# Patient Record
Sex: FEMALE | Race: OTHER | HISPANIC OR LATINO | Employment: UNEMPLOYED | URBAN - METROPOLITAN AREA
[De-identification: names, ages, dates, MRNs, and addresses within clinical notes are randomized per-mention and may not be internally consistent; named-entity substitution may affect disease eponyms.]

---

## 2017-12-04 ENCOUNTER — OFFICE VISIT (OUTPATIENT)
Dept: NEUROLOGY | Facility: CLINIC | Age: 52
End: 2017-12-04
Payer: MEDICAID

## 2017-12-04 VITALS
HEIGHT: 60 IN | DIASTOLIC BLOOD PRESSURE: 91 MMHG | HEART RATE: 71 BPM | BODY MASS INDEX: 35.23 KG/M2 | SYSTOLIC BLOOD PRESSURE: 150 MMHG | WEIGHT: 179.44 LBS

## 2017-12-04 DIAGNOSIS — G44.209 TENSION HEADACHE: ICD-10-CM

## 2017-12-04 DIAGNOSIS — G43.009 MIGRAINE WITHOUT AURA AND WITHOUT STATUS MIGRAINOSUS, NOT INTRACTABLE: Primary | ICD-10-CM

## 2017-12-04 PROCEDURE — 99204 OFFICE O/P NEW MOD 45 MIN: CPT | Mod: S$PBB,,, | Performed by: NEUROLOGICAL SURGERY

## 2017-12-04 PROCEDURE — 99999 PR PBB SHADOW E&M-NEW PATIENT-LVL III: CPT | Mod: PBBFAC,,, | Performed by: NEUROLOGICAL SURGERY

## 2017-12-04 PROCEDURE — 99203 OFFICE O/P NEW LOW 30 MIN: CPT | Mod: PBBFAC,PO | Performed by: NEUROLOGICAL SURGERY

## 2017-12-04 RX ORDER — MECLIZINE HYDROCHLORIDE 25 MG/1
25 TABLET ORAL 3 TIMES DAILY PRN
COMMUNITY

## 2017-12-04 RX ORDER — HYDROCHLOROTHIAZIDE 12.5 MG/1
12.5 TABLET ORAL DAILY
COMMUNITY

## 2017-12-04 RX ORDER — BUTALBITAL, ACETAMINOPHEN AND CAFFEINE 50; 325; 40 MG/1; MG/1; MG/1
1 TABLET ORAL EVERY 8 HOURS PRN
Qty: 60 TABLET | Refills: 2 | Status: SHIPPED | OUTPATIENT
Start: 2017-12-04 | End: 2018-01-03

## 2017-12-04 RX ORDER — CYCLOBENZAPRINE HCL 10 MG
10 TABLET ORAL NIGHTLY
Qty: 60 TABLET | Refills: 3 | Status: SHIPPED | OUTPATIENT
Start: 2017-12-04 | End: 2017-12-14

## 2017-12-04 RX ORDER — LORATADINE 10 MG/1
10 TABLET ORAL DAILY
COMMUNITY

## 2017-12-04 NOTE — PROGRESS NOTES
"Chief Complaint   Patient presents with    Neurologic Problem     Loss of balance         Lita Culver is a 51 y.o. female with a history of multiple medical diagnoses as listed below that presents for evaluation of headaches and imbalance. She was interviewed and examined with her son present. The patient chose to have her son interpret portions of the interview, which was allowed per the patient's preference. She has been having several months of pain in the center of the head that has been radiating on occasion to the left side of her head and also into the upper portion of her neck and shoulders as well. She describes the pain as a constant pressure in the center of the head that radiates to the left side. She has not felt that the pain has travelled to the right side of her head. During the time of the headache she feels that her gait is unstable and she is not able to maintain her balance. There have also been times when she is riding in a car and attempts to look over her shoulder and will suddenly feel "dizzy" for a few moments. during that time she feels that the car is still accelerating though she is braking and she feels that the cars on either side of her seem as if they are converging upon her. She has not had any sensations of the room spinning.  She says when she denied any headache she does not have any abnormal sensations.  She has not had any falls due to the unsteadiness in her legs.  The last few weeks she has had fewer symptoms and feels better overall compared to when she began to have symptoms several months ago.  She has a history of migraines but has not had any years.  She says her migraines that she had before.    PAST MEDICAL HISTORY:  History reviewed. No pertinent past medical history.    PAST SURGICAL HISTORY:  History reviewed. No pertinent surgical history.    SOCIAL HISTORY:  Social History     Social History    Marital status: Single     Spouse name: N/A    Number of " children: N/A    Years of education: N/A     Occupational History    Not on file.     Social History Main Topics    Smoking status: Not on file    Smokeless tobacco: Not on file    Alcohol use Not on file    Drug use: Unknown    Sexual activity: Not on file     Other Topics Concern    Not on file     Social History Narrative    No narrative on file       FAMILY HISTORY:  History reviewed. No pertinent family history.    ALLERGIES AND MEDICATIONS: updated and reviewed.  Review of patient's allergies indicates:  No Known Allergies  Current Outpatient Prescriptions   Medication Sig Dispense Refill    hydroCHLOROthiazide (HYDRODIURIL) 12.5 MG Tab Take 12.5 mg by mouth once daily.      loratadine (CLARITIN) 10 mg tablet Take 10 mg by mouth once daily.      meclizine (ANTIVERT) 25 mg tablet Take 25 mg by mouth 3 (three) times daily as needed.      butalbital-acetaminophen-caffeine -40 mg (FIORICET, ESGIC) -40 mg per tablet Take 1 tablet by mouth every 8 (eight) hours as needed for Pain or Headaches. 60 tablet 2    cyclobenzaprine (FLEXERIL) 10 MG tablet Take 1 tablet (10 mg total) by mouth nightly. 60 tablet 3     No current facility-administered medications for this visit.        Review of Systems   Constitutional: Negative for activity change, appetite change, fever and unexpected weight change.   HENT: Negative for hearing loss, tinnitus, trouble swallowing and voice change.    Eyes: Positive for photophobia. Negative for visual disturbance.   Respiratory: Negative for apnea and shortness of breath.    Cardiovascular: Negative for chest pain and leg swelling.   Gastrointestinal: Negative for constipation and nausea.   Genitourinary: Negative for difficulty urinating.   Musculoskeletal: Positive for gait problem, myalgias and neck pain. Negative for back pain.   Skin: Negative for color change and pallor.   Neurological: Positive for headaches. Negative for dizziness, seizures, syncope,  weakness and numbness.   Hematological: Negative for adenopathy.   Psychiatric/Behavioral: Negative for agitation, confusion and decreased concentration.       Neurologic Exam     Mental Status   Oriented to person, place, and time.   Registration: recalls 3 of 3 objects.   Attention: normal. Concentration: normal.   Speech: speech is normal   Level of consciousness: alert  Knowledge: good.     Cranial Nerves     CN II   Visual fields full to confrontation.   Right visual field deficit: none  Left visual field deficit: none     CN III, IV, VI   Pupils are equal, round, and reactive to light.  Extraocular motions are normal.   Right pupil: Size: 3 mm. Shape: regular. Accommodation: intact.   Left pupil: Size: 3 mm. Shape: regular. Accommodation: intact.   CN III: no CN III palsy  CN VI: no CN VI palsy  Nystagmus: none   Diplopia: none  Ophthalmoparesis: none  Upgaze: normal  Downgaze: normal  Conjugate gaze: present    CN V   Facial sensation intact.   Right facial sensation deficit: none  Left facial sensation deficit: none    CN VII   Facial expression full, symmetric.   Right facial weakness: none  Left facial weakness: none    CN VIII   CN VIII normal.     CN IX, X   CN IX normal.   CN X normal.   Palate: symmetric    CN XI   CN XI normal.   Right sternocleidomastoid strength: normal  Left sternocleidomastoid strength: normal  Right trapezius strength: normal  Left trapezius strength: normal    CN XII   CN XII normal.   Tongue deviation: none    Motor Exam   Muscle bulk: normal  Overall muscle tone: normal  Right arm tone: normal  Left arm tone: normal  Right leg tone: normal  Left leg tone: normal    Strength   Strength 5/5 throughout.     Sensory Exam   Right arm light touch: normal  Left arm light touch: normal  Right leg light touch: normal  Left leg light touch: normal  Right arm vibration: normal  Left arm vibration: normal  Right leg vibration: normal  Left leg vibration: normal  Right arm proprioception:  normal  Left arm proprioception: normal  Right leg proprioception: normal  Left leg proprioception: normal  Right arm pinprick: normal  Left arm pinprick: normal  Right leg pinprick: normal  Left leg pinprick: normal    Gait, Coordination, and Reflexes     Gait  Gait: normal    Coordination   Romberg: negative  Finger to nose coordination: normal  Heel to shin coordination: normal  Tandem walking coordination: normal    Tremor   Resting tremor: absent    Reflexes   Right brachioradialis: 2+  Left brachioradialis: 2+  Right biceps: 2+  Left biceps: 2+  Right triceps: 2+  Left triceps: 2+  Right patellar: 2+  Left patellar: 2+  Right achilles: 2+  Left achilles: 2+  Right plantar: normal  Left plantar: normal      Physical Exam   Constitutional: She is oriented to person, place, and time. She appears well-developed and well-nourished.   HENT:   Head: Normocephalic and atraumatic.   Eyes: EOM are normal. Pupils are equal, round, and reactive to light.   Neck: Normal range of motion.   Cardiovascular: Normal rate and intact distal pulses.    Pulmonary/Chest: Effort normal. No apnea. No respiratory distress.   Musculoskeletal: Normal range of motion.   Neurological: She is alert and oriented to person, place, and time. She has normal strength. She has a normal Finger-Nose-Finger Test, a normal Heel to Shin Test, a normal Romberg Test and a normal Tandem Gait Test. Gait normal.   Reflex Scores:       Tricep reflexes are 2+ on the right side and 2+ on the left side.       Bicep reflexes are 2+ on the right side and 2+ on the left side.       Brachioradialis reflexes are 2+ on the right side and 2+ on the left side.       Patellar reflexes are 2+ on the right side and 2+ on the left side.       Achilles reflexes are 2+ on the right side and 2+ on the left side.  Skin: Skin is warm and dry.   Psychiatric: She has a normal mood and affect. Her speech is normal and behavior is normal. Thought content normal.   Vitals  reviewed.      Vitals:    12/04/17 0821   BP: (!) 150/91   BP Location: Left arm   Patient Position: Sitting   BP Method: Large (Automatic)   Pulse: 71   Weight: 81.4 kg (179 lb 7.3 oz)   Height: 5' (1.524 m)     CT head personally reviewed: no acute changes.    Assessment & Plan:    Problem List Items Addressed This Visit     None      Visit Diagnoses     Migraine without aura and without status migrainosus, not intractable    -  Primary    Relevant Medications    butalbital-acetaminophen-caffeine -40 mg (FIORICET, ESGIC) -40 mg per tablet    cyclobenzaprine (FLEXERIL) 10 MG tablet    Other Relevant Orders    MRI Brain W WO Contrast    Creatinine, serum    Tension headache        Relevant Medications    cyclobenzaprine (FLEXERIL) 10 MG tablet          Follow-up: Return if symptoms worsen or fail to improve.  More than 50% of this 45 minute encounter was spent in counseling and coordinating care.

## 2017-12-08 ENCOUNTER — HOSPITAL ENCOUNTER (OUTPATIENT)
Dept: RADIOLOGY | Facility: HOSPITAL | Age: 52
Discharge: HOME | End: 2017-12-08
Attending: NEUROLOGICAL SURGERY
Payer: MEDICAID

## 2017-12-08 DIAGNOSIS — G43.009 MIGRAINE WITHOUT AURA AND WITHOUT STATUS MIGRAINOSUS, NOT INTRACTABLE: ICD-10-CM

## 2017-12-08 PROCEDURE — A9585 GADOBUTROL INJECTION: HCPCS | Performed by: NEUROLOGICAL SURGERY

## 2017-12-08 PROCEDURE — 70553 MRI BRAIN STEM W/O & W/DYE: CPT | Mod: TC

## 2017-12-08 PROCEDURE — 25500020 PHARM REV CODE 255: Performed by: NEUROLOGICAL SURGERY

## 2017-12-08 PROCEDURE — 70553 MRI BRAIN STEM W/O & W/DYE: CPT | Mod: 26,,, | Performed by: RADIOLOGY

## 2017-12-08 RX ORDER — GADOBUTROL 604.72 MG/ML
8 INJECTION INTRAVENOUS
Status: COMPLETED | OUTPATIENT
Start: 2017-12-08 | End: 2017-12-08

## 2017-12-08 RX ADMIN — GADOBUTROL 8 ML: 604.72 INJECTION INTRAVENOUS at 01:12

## 2017-12-12 ENCOUNTER — TELEPHONE (OUTPATIENT)
Dept: NEUROLOGY | Facility: CLINIC | Age: 52
End: 2017-12-12

## 2017-12-12 NOTE — TELEPHONE ENCOUNTER
----- Message from Tra Townsend sent at 12/12/2017 11:38 AM CST -----  Contact: [employer]  Pt's son called to get MRI results     Mr Oliva- [phone]      Informed son of normal results

## 2021-07-20 ENCOUNTER — HOSPITAL ENCOUNTER (EMERGENCY)
Facility: HOSPITAL | Age: 56
Discharge: HOME | End: 2021-07-20
Attending: EMERGENCY MEDICINE
Payer: MEDICAID

## 2021-07-20 VITALS
RESPIRATION RATE: 16 BRPM | HEIGHT: 64 IN | TEMPERATURE: 98 F | SYSTOLIC BLOOD PRESSURE: 115 MMHG | WEIGHT: 168 LBS | HEART RATE: 68 BPM | OXYGEN SATURATION: 100 % | DIASTOLIC BLOOD PRESSURE: 70 MMHG | BODY MASS INDEX: 28.68 KG/M2

## 2021-07-20 DIAGNOSIS — R89.4 INFLUENZA A VIRUS NOT DETECTED: ICD-10-CM

## 2021-07-20 DIAGNOSIS — M79.10 MYALGIA: ICD-10-CM

## 2021-07-20 DIAGNOSIS — J06.9 VIRAL UPPER RESPIRATORY TRACT INFECTION: Primary | ICD-10-CM

## 2021-07-20 DIAGNOSIS — Z01.84 COVID-19 VIRUS IGG ANTIBODY NOT DETECTED: ICD-10-CM

## 2021-07-20 DIAGNOSIS — R09.81 NASAL CONGESTION: ICD-10-CM

## 2021-07-20 LAB
ALBUMIN SERPL BCP-MCNC: 3.8 G/DL (ref 3.5–5.2)
ALP SERPL-CCNC: 76 U/L (ref 55–135)
ALT SERPL W/O P-5'-P-CCNC: 24 U/L (ref 10–44)
ANION GAP SERPL CALC-SCNC: 14 MMOL/L (ref 8–16)
AST SERPL-CCNC: 26 U/L (ref 10–40)
BASOPHILS # BLD AUTO: 0.04 K/UL (ref 0–0.2)
BASOPHILS NFR BLD: 0.7 % (ref 0–1.9)
BILIRUB SERPL-MCNC: 0.5 MG/DL (ref 0.1–1)
BILIRUB UR QL STRIP: NEGATIVE
BUN SERPL-MCNC: 12 MG/DL (ref 6–20)
CALCIUM SERPL-MCNC: 9.1 MG/DL (ref 8.7–10.5)
CHLORIDE SERPL-SCNC: 105 MMOL/L (ref 95–110)
CK SERPL-CCNC: 138 U/L (ref 20–180)
CLARITY UR REFRACT.AUTO: CLEAR
CO2 SERPL-SCNC: 22 MMOL/L (ref 23–29)
COLOR UR AUTO: YELLOW
CREAT SERPL-MCNC: 0.6 MG/DL (ref 0.5–1.4)
CTP QC/QA: YES
CTP QC/QA: YES
DIFFERENTIAL METHOD: ABNORMAL
EOSINOPHIL # BLD AUTO: 0.1 K/UL (ref 0–0.5)
EOSINOPHIL NFR BLD: 1.9 % (ref 0–8)
ERYTHROCYTE [DISTWIDTH] IN BLOOD BY AUTOMATED COUNT: 14.2 % (ref 11.5–14.5)
EST. GFR  (AFRICAN AMERICAN): >60 ML/MIN/1.73 M^2
EST. GFR  (NON AFRICAN AMERICAN): >60 ML/MIN/1.73 M^2
GLUCOSE SERPL-MCNC: 66 MG/DL (ref 70–110)
GLUCOSE UR QL STRIP: NEGATIVE
HCT VFR BLD AUTO: 41.7 % (ref 37–48.5)
HCV AB SERPL QL IA: NEGATIVE
HGB BLD-MCNC: 13.9 G/DL (ref 12–16)
HGB UR QL STRIP: NEGATIVE
HIV 1+2 AB+HIV1 P24 AG SERPL QL IA: NEGATIVE
IMM GRANULOCYTES # BLD AUTO: 0.02 K/UL (ref 0–0.04)
IMM GRANULOCYTES NFR BLD AUTO: 0.4 % (ref 0–0.5)
KETONES UR QL STRIP: NEGATIVE
LEUKOCYTE ESTERASE UR QL STRIP: NEGATIVE
LYMPHOCYTES # BLD AUTO: 2.5 K/UL (ref 1–4.8)
LYMPHOCYTES NFR BLD: 43.4 % (ref 18–48)
MCH RBC QN AUTO: 26.5 PG (ref 27–31)
MCHC RBC AUTO-ENTMCNC: 33.3 G/DL (ref 32–36)
MCV RBC AUTO: 79 FL (ref 82–98)
MONOCYTES # BLD AUTO: 0.6 K/UL (ref 0.3–1)
MONOCYTES NFR BLD: 9.8 % (ref 4–15)
NEUTROPHILS # BLD AUTO: 2.5 K/UL (ref 1.8–7.7)
NEUTROPHILS NFR BLD: 43.8 % (ref 38–73)
NITRITE UR QL STRIP: NEGATIVE
NRBC BLD-RTO: 0 /100 WBC
PH UR STRIP: 8 [PH] (ref 5–8)
PLATELET # BLD AUTO: 232 K/UL (ref 150–450)
PMV BLD AUTO: 11.4 FL (ref 9.2–12.9)
POC MOLECULAR INFLUENZA A AGN: NEGATIVE
POC MOLECULAR INFLUENZA B AGN: NEGATIVE
POTASSIUM SERPL-SCNC: 3.4 MMOL/L (ref 3.5–5.1)
PROT SERPL-MCNC: 7.5 G/DL (ref 6–8.4)
PROT UR QL STRIP: NEGATIVE
RBC # BLD AUTO: 5.25 M/UL (ref 4–5.4)
SARS-COV-2 RDRP RESP QL NAA+PROBE: NEGATIVE
SODIUM SERPL-SCNC: 141 MMOL/L (ref 136–145)
SP GR UR STRIP: 1.02 (ref 1–1.03)
URN SPEC COLLECT METH UR: NORMAL
WBC # BLD AUTO: 5.71 K/UL (ref 3.9–12.7)

## 2021-07-20 PROCEDURE — 63600175 PHARM REV CODE 636 W HCPCS: Performed by: EMERGENCY MEDICINE

## 2021-07-20 PROCEDURE — 87389 HIV-1 AG W/HIV-1&-2 AB AG IA: CPT | Performed by: EMERGENCY MEDICINE

## 2021-07-20 PROCEDURE — 81003 URINALYSIS AUTO W/O SCOPE: CPT | Performed by: EMERGENCY MEDICINE

## 2021-07-20 PROCEDURE — 80053 COMPREHEN METABOLIC PANEL: CPT | Performed by: EMERGENCY MEDICINE

## 2021-07-20 PROCEDURE — 99284 EMERGENCY DEPT VISIT MOD MDM: CPT | Mod: CR,CS,, | Performed by: EMERGENCY MEDICINE

## 2021-07-20 PROCEDURE — 85025 COMPLETE CBC W/AUTO DIFF WBC: CPT | Performed by: EMERGENCY MEDICINE

## 2021-07-20 PROCEDURE — 99284 PR EMERGENCY DEPT VISIT,LEVEL IV: ICD-10-PCS | Mod: CR,CS,, | Performed by: EMERGENCY MEDICINE

## 2021-07-20 PROCEDURE — 25000003 PHARM REV CODE 250: Performed by: EMERGENCY MEDICINE

## 2021-07-20 PROCEDURE — 86803 HEPATITIS C AB TEST: CPT | Performed by: EMERGENCY MEDICINE

## 2021-07-20 PROCEDURE — 96374 THER/PROPH/DIAG INJ IV PUSH: CPT

## 2021-07-20 PROCEDURE — 99284 EMERGENCY DEPT VISIT MOD MDM: CPT | Mod: 25

## 2021-07-20 PROCEDURE — U0002 COVID-19 LAB TEST NON-CDC: HCPCS | Performed by: EMERGENCY MEDICINE

## 2021-07-20 PROCEDURE — 82550 ASSAY OF CK (CPK): CPT | Performed by: EMERGENCY MEDICINE

## 2021-07-20 PROCEDURE — 87502 INFLUENZA DNA AMP PROBE: CPT

## 2021-07-20 RX ORDER — KETOROLAC TROMETHAMINE 30 MG/ML
10 INJECTION, SOLUTION INTRAMUSCULAR; INTRAVENOUS
Status: COMPLETED | OUTPATIENT
Start: 2021-07-20 | End: 2021-07-20

## 2021-07-20 RX ORDER — ACETAMINOPHEN 500 MG
1000 TABLET ORAL
Status: COMPLETED | OUTPATIENT
Start: 2021-07-20 | End: 2021-07-20

## 2021-07-20 RX ADMIN — KETOROLAC TROMETHAMINE 10 MG: 30 INJECTION, SOLUTION INTRAMUSCULAR; INTRAVENOUS at 01:07

## 2021-07-20 RX ADMIN — ACETAMINOPHEN 1000 MG: 500 TABLET ORAL at 02:07

## 2022-08-31 ENCOUNTER — TELEPHONE (OUTPATIENT)
Dept: SPORTS MEDICINE | Facility: CLINIC | Age: 57
End: 2022-08-31
Payer: MEDICAID

## 2022-08-31 DIAGNOSIS — M25.511 BILATERAL SHOULDER PAIN, UNSPECIFIED CHRONICITY: Primary | ICD-10-CM

## 2022-08-31 DIAGNOSIS — M25.512 BILATERAL SHOULDER PAIN, UNSPECIFIED CHRONICITY: Primary | ICD-10-CM

## 2022-08-31 NOTE — TELEPHONE ENCOUNTER
Spoke with pt's son and found out that pt is coming for both shoulders. Let him know that they need to come 30mins prior to appt to get xrays done. He voiced understanding

## 2022-09-06 ENCOUNTER — OFFICE VISIT (OUTPATIENT)
Dept: SPORTS MEDICINE | Facility: CLINIC | Age: 57
End: 2022-09-06
Payer: MEDICAID

## 2022-09-06 VITALS
DIASTOLIC BLOOD PRESSURE: 85 MMHG | HEART RATE: 76 BPM | SYSTOLIC BLOOD PRESSURE: 125 MMHG | BODY MASS INDEX: 30.11 KG/M2 | WEIGHT: 176.38 LBS | HEIGHT: 64 IN

## 2022-09-06 DIAGNOSIS — M25.519 PAIN IN UNSPECIFIED SHOULDER: ICD-10-CM

## 2022-09-06 DIAGNOSIS — M19.011 PRIMARY OSTEOARTHRITIS OF BOTH SHOULDERS: Primary | ICD-10-CM

## 2022-09-06 DIAGNOSIS — M19.012 PRIMARY OSTEOARTHRITIS OF BOTH SHOULDERS: Primary | ICD-10-CM

## 2022-09-06 PROCEDURE — 1159F PR MEDICATION LIST DOCUMENTED IN MEDICAL RECORD: ICD-10-PCS | Mod: CPTII,,, | Performed by: STUDENT IN AN ORGANIZED HEALTH CARE EDUCATION/TRAINING PROGRAM

## 2022-09-06 PROCEDURE — 99999 PR PBB SHADOW E&M-EST. PATIENT-LVL III: CPT | Mod: PBBFAC,,, | Performed by: STUDENT IN AN ORGANIZED HEALTH CARE EDUCATION/TRAINING PROGRAM

## 2022-09-06 PROCEDURE — 1159F MED LIST DOCD IN RCRD: CPT | Mod: CPTII,,, | Performed by: STUDENT IN AN ORGANIZED HEALTH CARE EDUCATION/TRAINING PROGRAM

## 2022-09-06 PROCEDURE — 3008F BODY MASS INDEX DOCD: CPT | Mod: CPTII,,, | Performed by: STUDENT IN AN ORGANIZED HEALTH CARE EDUCATION/TRAINING PROGRAM

## 2022-09-06 PROCEDURE — 99204 PR OFFICE/OUTPT VISIT, NEW, LEVL IV, 45-59 MIN: ICD-10-PCS | Mod: S$PBB,,, | Performed by: STUDENT IN AN ORGANIZED HEALTH CARE EDUCATION/TRAINING PROGRAM

## 2022-09-06 PROCEDURE — 3079F PR MOST RECENT DIASTOLIC BLOOD PRESSURE 80-89 MM HG: ICD-10-PCS | Mod: CPTII,,, | Performed by: STUDENT IN AN ORGANIZED HEALTH CARE EDUCATION/TRAINING PROGRAM

## 2022-09-06 PROCEDURE — 1160F PR REVIEW ALL MEDS BY PRESCRIBER/CLIN PHARMACIST DOCUMENTED: ICD-10-PCS | Mod: CPTII,,, | Performed by: STUDENT IN AN ORGANIZED HEALTH CARE EDUCATION/TRAINING PROGRAM

## 2022-09-06 PROCEDURE — 3008F PR BODY MASS INDEX (BMI) DOCUMENTED: ICD-10-PCS | Mod: CPTII,,, | Performed by: STUDENT IN AN ORGANIZED HEALTH CARE EDUCATION/TRAINING PROGRAM

## 2022-09-06 PROCEDURE — 1160F RVW MEDS BY RX/DR IN RCRD: CPT | Mod: CPTII,,, | Performed by: STUDENT IN AN ORGANIZED HEALTH CARE EDUCATION/TRAINING PROGRAM

## 2022-09-06 PROCEDURE — 99213 OFFICE O/P EST LOW 20 MIN: CPT | Mod: PBBFAC,PN | Performed by: STUDENT IN AN ORGANIZED HEALTH CARE EDUCATION/TRAINING PROGRAM

## 2022-09-06 PROCEDURE — 3074F PR MOST RECENT SYSTOLIC BLOOD PRESSURE < 130 MM HG: ICD-10-PCS | Mod: CPTII,,, | Performed by: STUDENT IN AN ORGANIZED HEALTH CARE EDUCATION/TRAINING PROGRAM

## 2022-09-06 PROCEDURE — 99999 PR PBB SHADOW E&M-EST. PATIENT-LVL III: ICD-10-PCS | Mod: PBBFAC,,, | Performed by: STUDENT IN AN ORGANIZED HEALTH CARE EDUCATION/TRAINING PROGRAM

## 2022-09-06 PROCEDURE — 3079F DIAST BP 80-89 MM HG: CPT | Mod: CPTII,,, | Performed by: STUDENT IN AN ORGANIZED HEALTH CARE EDUCATION/TRAINING PROGRAM

## 2022-09-06 PROCEDURE — 99204 OFFICE O/P NEW MOD 45 MIN: CPT | Mod: S$PBB,,, | Performed by: STUDENT IN AN ORGANIZED HEALTH CARE EDUCATION/TRAINING PROGRAM

## 2022-09-06 PROCEDURE — 3074F SYST BP LT 130 MM HG: CPT | Mod: CPTII,,, | Performed by: STUDENT IN AN ORGANIZED HEALTH CARE EDUCATION/TRAINING PROGRAM

## 2022-09-06 NOTE — PROGRESS NOTES
Subjective:   The patient is Bulgarian-speaking.  The services an  were offered, however it was denied by the patient she wished to have her family member served as the .       Chief Complaint: Lita Culver is a 56 y.o. female who had concerns including Pain of the Right Shoulder and Pain of the Left Shoulder.    CC:  bilateral Shoulder Pain    HPI:    Lita Culver is a 56 y.o. female that presents for evaluation for her bilateral shoulder pain. She states that her pain started about 2 months ago after lifting a heavy box at work. She rates her pain as a 7/10 primarily over the anterior aspects of both shoulders. She states that her right shoulder is worse then the left. She denies any instability.  The pain increases with with any movement of the arm particularly as the arm reaches shoulder level.  Denies any neck pain.  Overall she is 65% of her baseline right shoulder function 65% of her baseline left shoulder function.  The pain makes it difficult to perform ADLs.  She has had no treatment for this, including no physical therapy or corticosteroid injections.        Dominant Hand: Right    Occupation: NA    SSV: R 65%  L 65%    Night Pain? yes    Interfere with ADLs? yes       History reviewed. No pertinent past medical history.    Current Outpatient Medications on File Prior to Visit   Medication Sig Dispense Refill    hydroCHLOROthiazide (HYDRODIURIL) 12.5 MG Tab Take 12.5 mg by mouth once daily.      loratadine (CLARITIN) 10 mg tablet Take 10 mg by mouth once daily.      meclizine (ANTIVERT) 25 mg tablet Take 25 mg by mouth 3 (three) times daily as needed.       No current facility-administered medications on file prior to visit.       History reviewed. No pertinent surgical history.    History reviewed. No pertinent family history.    Social History     Socioeconomic History    Marital status: Single       Review of Systems   Constitutional: Negative.   HENT: Negative.     Eyes:  Negative.    Cardiovascular: Negative.    Respiratory: Negative.     Endocrine: Negative.    Hematologic/Lymphatic: Negative.    Skin: Negative.    Musculoskeletal:  Positive for arthritis (bilateral shoulder) and joint pain (bilateral shoulder). Negative for back pain, falls, gout, joint swelling, muscle cramps, muscle weakness, myalgias, neck pain and stiffness.   Neurological: Negative.    Psychiatric/Behavioral: Negative.     Allergic/Immunologic: Negative.                  Objective:        General: Lita is well-developed, well-nourished, appears stated age, in no acute distress, alert and oriented to time, place and person.     General    Nursing note and vitals reviewed.  Constitutional: She is oriented to person, place, and time. She appears well-developed and well-nourished. No distress.   HENT:   Head: Normocephalic and atraumatic.   Nose: Nose normal.   Eyes: EOM are normal.   Cardiovascular:  Intact distal pulses.            Pulmonary/Chest: Effort normal. No respiratory distress.   Neurological: She is alert and oriented to person, place, and time.   Psychiatric: She has a normal mood and affect. Her behavior is normal. Judgment and thought content normal.         Right Shoulder Exam     Inspection/Observation   Swelling: absent  Bruising: absent  Scars: absent  Deformity: absent  Scapular Winging: absent  Scapular Dyskinesia: negative  Atrophy: absent    Tenderness   The patient is tender to palpation of the greater tuberosity.    Range of Motion   Active abduction:  100   Passive abduction:  160   Forward Flexion:  120   Forward Elevation: 180  External Rotation 0 degrees:  60   Internal rotation 0 degrees:  Mid thoracic     Tests & Signs   Cross arm: positive  Reed test: positive  Impingement: positive  Lift Off Sign: negative  Belly Press: negative  Active Compression Test ([place]'s Sign): positive  Speed's Test: negative  Bear Hug: negative    Other   Sensation: normal    Left Shoulder  Exam     Inspection/Observation   Swelling: absent  Bruising: absent  Scars: absent  Deformity: absent  Scapular Winging: absent  Scapular Dyskinesia: negative  Atrophy: absent    Tenderness   The patient is tender to palpation of the greater tuberosity.    Range of Motion   Active abduction:  100   Passive abduction:  160   Forward Flexion:  120   Forward Elevation: 180  External Rotation 0 degrees:  60   Internal rotation 0 degrees:  Mid thoracic     Tests & Signs   Cross arm: positive  Reed test: positive  Impingement: positive  Lift Off Sign: negative  Belly Press: negative  Active Compression Test ([place]'s Sign): positive  Speed's Test: negative  Bear Hug: negative    Other   Sensation: normal       Muscle Strength   Right Upper Extremity   Shoulder Abduction: 4/5   Shoulder Internal Rotation: 4/5   Shoulder External Rotation: 4/5   Supraspinatus: 4/5   Subscapularis: 4/5   Biceps: 5/5   Left Upper Extremity  Shoulder Abduction: 4/5   Shoulder Internal Rotation: 4/5   Shoulder External Rotation: 4/5   Supraspinatus: 4/5   Subscapularis: 4/5   Biceps: 5/5     Vascular Exam     Right Pulses      Radial:                    2+      Left Pulses      Radial:                    2+      Capillary Refill  Right Hand: normal capillary refill  Left Hand: normal capillary refill      Imaging:  X-rays of the bilateral shoulders from 09/06/2022 personally reviewed by me on that day.  These include AP, Grashey, scapular Y, axillary lateral.  Moderate arthritic changes in the bilateral glenohumeral joints with decreased joint space.  Marginal osteophyte formation on the inferior aspect the humeral head.  No superior migration of the humeral head bilaterally.          Assessment:     Lita Culver is a 56 y.o. female bilateral glenohumeral osteoarthritis as well as subacromial impingement  Encounter Diagnoses   Name Primary?    Pain in unspecified shoulder     Primary osteoarthritis of both shoulders Yes          Plan:     The diagnosis and treatment options were discussed at length with the patient all her questions were answered.  I showed her the x-rays and explained the findings to her.  We discussed treatment options for earlier osteoarthritis of the shoulder and impingement.  He has not had any treatment for this this date.  I recommended we begin with a course of physical therapy.  Return to clinic in 2 months for repeat evaluation.  If she does not have significant improvement we can consider MRI.    All of their questions were answered.  They will call the clinic with any questions or concerns in the interim.    Should the patient's symptoms worsen, persist, or fail to improve they should return for reevaluation and I would be happy to see them back anytime.        Raul Iqbal M.D.    Please be aware that this note has been generated with the assistance of [employer] voice-to-text.  Please excuse any spelling or grammatical errors.    Thank you for choosing Dr. Raul Iqbal for your sports medicine care. It is our goal to provide you with exceptional care that will help keep you healthy, active, and get you back in the game.     If you felt that you received exemplary care today, please consider leaving feedback for Dr. Iqbal on [employer] at [email or web address].    Please do not hesitate to reach out to us via email, phone, or MyChart with any questions, concerns, or feedback.

## 2024-01-28 ENCOUNTER — HOSPITAL ENCOUNTER (EMERGENCY)
Facility: HOSPITAL | Age: 59
Discharge: HOME | End: 2024-01-28
Attending: STUDENT IN AN ORGANIZED HEALTH CARE EDUCATION/TRAINING PROGRAM
Payer: MEDICAID

## 2024-01-28 VITALS
BODY MASS INDEX: 30.48 KG/M2 | WEIGHT: 172 LBS | HEIGHT: 63 IN | HEART RATE: 66 BPM | OXYGEN SATURATION: 98 % | RESPIRATION RATE: 16 BRPM | SYSTOLIC BLOOD PRESSURE: 140 MMHG | TEMPERATURE: 98 F | DIASTOLIC BLOOD PRESSURE: 70 MMHG

## 2024-01-28 DIAGNOSIS — R51.9 HEADACHE: ICD-10-CM

## 2024-01-28 DIAGNOSIS — R07.89 CHEST WALL PAIN: ICD-10-CM

## 2024-01-28 LAB
ALBUMIN SERPL BCP-MCNC: 3.9 G/DL (ref 3.5–5.2)
ALP SERPL-CCNC: 96 U/L (ref 55–135)
ALT SERPL W/O P-5'-P-CCNC: 29 U/L (ref 10–44)
ANION GAP SERPL CALC-SCNC: 8 MMOL/L (ref 8–16)
AST SERPL-CCNC: 22 U/L (ref 10–40)
BASOPHILS # BLD AUTO: 0.03 K/UL (ref 0–0.2)
BASOPHILS NFR BLD: 0.6 % (ref 0–1.9)
BILIRUB SERPL-MCNC: 0.3 MG/DL (ref 0.1–1)
BUN SERPL-MCNC: 12 MG/DL (ref 6–20)
CALCIUM SERPL-MCNC: 9.4 MG/DL (ref 8.7–10.5)
CHLORIDE SERPL-SCNC: 102 MMOL/L (ref 95–110)
CO2 SERPL-SCNC: 26 MMOL/L (ref 23–29)
CREAT SERPL-MCNC: 0.7 MG/DL (ref 0.5–1.4)
DIFFERENTIAL METHOD BLD: ABNORMAL
EOSINOPHIL # BLD AUTO: 0.1 K/UL (ref 0–0.5)
EOSINOPHIL NFR BLD: 1.9 % (ref 0–8)
ERYTHROCYTE [DISTWIDTH] IN BLOOD BY AUTOMATED COUNT: 13.9 % (ref 11.5–14.5)
EST. GFR  (NO RACE VARIABLE): >60 ML/MIN/1.73 M^2
GLUCOSE SERPL-MCNC: 121 MG/DL (ref 70–110)
HCT VFR BLD AUTO: 38.7 % (ref 37–48.5)
HCV AB SERPL QL IA: NORMAL
HGB BLD-MCNC: 13 G/DL (ref 12–16)
HIV 1+2 AB+HIV1 P24 AG SERPL QL IA: NORMAL
IMM GRANULOCYTES # BLD AUTO: 0.01 K/UL (ref 0–0.04)
IMM GRANULOCYTES NFR BLD AUTO: 0.2 % (ref 0–0.5)
INFLUENZA A, MOLECULAR: NOT DETECTED
INFLUENZA B, MOLECULAR: NOT DETECTED
LYMPHOCYTES # BLD AUTO: 2.2 K/UL (ref 1–4.8)
LYMPHOCYTES NFR BLD: 40.3 % (ref 18–48)
MCH RBC QN AUTO: 26.6 PG (ref 27–31)
MCHC RBC AUTO-ENTMCNC: 33.6 G/DL (ref 32–36)
MCV RBC AUTO: 79 FL (ref 82–98)
MONOCYTES # BLD AUTO: 0.4 K/UL (ref 0.3–1)
MONOCYTES NFR BLD: 7.8 % (ref 4–15)
NEUTROPHILS # BLD AUTO: 2.6 K/UL (ref 1.8–7.7)
NEUTROPHILS NFR BLD: 49.2 % (ref 38–73)
NRBC BLD-RTO: 0 /100 WBC
PLATELET # BLD AUTO: 207 K/UL (ref 150–450)
PMV BLD AUTO: 12 FL (ref 9.2–12.9)
POTASSIUM SERPL-SCNC: 3.2 MMOL/L (ref 3.5–5.1)
PROT SERPL-MCNC: 7.2 G/DL (ref 6–8.4)
RBC # BLD AUTO: 4.88 M/UL (ref 4–5.4)
RSV AG BY MOLECULAR METHOD: NOT DETECTED
SARS-COV-2 RNA RESP QL NAA+PROBE: NOT DETECTED
SODIUM SERPL-SCNC: 136 MMOL/L (ref 136–145)
TROPONIN I SERPL DL<=0.01 NG/ML-MCNC: <0.006 NG/ML (ref 0–0.03)
WBC # BLD AUTO: 5.36 K/UL (ref 3.9–12.7)

## 2024-01-28 PROCEDURE — 84484 ASSAY OF TROPONIN QUANT: CPT | Performed by: STUDENT IN AN ORGANIZED HEALTH CARE EDUCATION/TRAINING PROGRAM

## 2024-01-28 PROCEDURE — 96375 TX/PRO/DX INJ NEW DRUG ADDON: CPT

## 2024-01-28 PROCEDURE — 80053 COMPREHEN METABOLIC PANEL: CPT | Performed by: STUDENT IN AN ORGANIZED HEALTH CARE EDUCATION/TRAINING PROGRAM

## 2024-01-28 PROCEDURE — 85025 COMPLETE CBC W/AUTO DIFF WBC: CPT | Performed by: STUDENT IN AN ORGANIZED HEALTH CARE EDUCATION/TRAINING PROGRAM

## 2024-01-28 PROCEDURE — 96374 THER/PROPH/DIAG INJ IV PUSH: CPT

## 2024-01-28 PROCEDURE — 93010 ELECTROCARDIOGRAM REPORT: CPT | Mod: ,,, | Performed by: INTERNAL MEDICINE

## 2024-01-28 PROCEDURE — 99285 EMERGENCY DEPT VISIT HI MDM: CPT | Mod: 25

## 2024-01-28 PROCEDURE — 87389 HIV-1 AG W/HIV-1&-2 AB AG IA: CPT | Performed by: PHYSICIAN ASSISTANT

## 2024-01-28 PROCEDURE — 0241U SARS-COV2 (COVID) WITH FLU/RSV BY PCR: CPT | Performed by: STUDENT IN AN ORGANIZED HEALTH CARE EDUCATION/TRAINING PROGRAM

## 2024-01-28 PROCEDURE — 63600175 PHARM REV CODE 636 W HCPCS: Performed by: STUDENT IN AN ORGANIZED HEALTH CARE EDUCATION/TRAINING PROGRAM

## 2024-01-28 PROCEDURE — 25000003 PHARM REV CODE 250: Performed by: STUDENT IN AN ORGANIZED HEALTH CARE EDUCATION/TRAINING PROGRAM

## 2024-01-28 PROCEDURE — 86803 HEPATITIS C AB TEST: CPT | Performed by: PHYSICIAN ASSISTANT

## 2024-01-28 PROCEDURE — 93005 ELECTROCARDIOGRAM TRACING: CPT

## 2024-01-28 PROCEDURE — 96361 HYDRATE IV INFUSION ADD-ON: CPT

## 2024-01-28 RX ORDER — SERTRALINE HYDROCHLORIDE 50 MG/1
50 TABLET, FILM COATED ORAL DAILY
COMMUNITY

## 2024-01-28 RX ORDER — KETOROLAC TROMETHAMINE 30 MG/ML
15 INJECTION, SOLUTION INTRAMUSCULAR; INTRAVENOUS
Status: COMPLETED | OUTPATIENT
Start: 2024-01-28 | End: 2024-01-28

## 2024-01-28 RX ORDER — FLUTICASONE PROPIONATE 50 UG/1
POWDER, METERED RESPIRATORY (INHALATION)
COMMUNITY

## 2024-01-28 RX ORDER — METHOCARBAMOL 750 MG/1
500 TABLET, FILM COATED ORAL 4 TIMES DAILY PRN
COMMUNITY

## 2024-01-28 RX ORDER — HYDROXYZINE HYDROCHLORIDE 50 MG/1
50 TABLET, FILM COATED ORAL 4 TIMES DAILY
COMMUNITY

## 2024-01-28 RX ORDER — MELOXICAM 7.5 MG/1
7.5 TABLET ORAL DAILY
COMMUNITY

## 2024-01-28 RX ORDER — PROCHLORPERAZINE EDISYLATE 5 MG/ML
10 INJECTION INTRAMUSCULAR; INTRAVENOUS
Status: COMPLETED | OUTPATIENT
Start: 2024-01-28 | End: 2024-01-28

## 2024-01-28 RX ORDER — CETIRIZINE HYDROCHLORIDE 10 MG/1
10 TABLET, CHEWABLE ORAL DAILY
COMMUNITY

## 2024-01-28 RX ORDER — METFORMIN HYDROCHLORIDE 500 MG/1
500 TABLET ORAL 2 TIMES DAILY WITH MEALS
COMMUNITY

## 2024-01-28 RX ORDER — ATORVASTATIN CALCIUM 40 MG/1
40 TABLET, FILM COATED ORAL DAILY
COMMUNITY

## 2024-01-28 RX ORDER — DIPHENHYDRAMINE HYDROCHLORIDE 50 MG/ML
12.5 INJECTION INTRAMUSCULAR; INTRAVENOUS
Status: COMPLETED | OUTPATIENT
Start: 2024-01-28 | End: 2024-01-28

## 2024-01-28 RX ORDER — CHLORTHALIDONE 50 MG/1
25 TABLET ORAL DAILY
COMMUNITY

## 2024-01-28 RX ADMIN — PROCHLORPERAZINE EDISYLATE 10 MG: 5 INJECTION INTRAMUSCULAR; INTRAVENOUS at 07:01

## 2024-01-28 RX ADMIN — DIPHENHYDRAMINE HYDROCHLORIDE 12.5 MG: 50 INJECTION, SOLUTION INTRAMUSCULAR; INTRAVENOUS at 07:01

## 2024-01-28 RX ADMIN — KETOROLAC TROMETHAMINE 15 MG: 30 INJECTION, SOLUTION INTRAMUSCULAR; INTRAVENOUS at 07:01

## 2024-01-28 RX ADMIN — SODIUM CHLORIDE 1000 ML: 9 INJECTION, SOLUTION INTRAVENOUS at 07:01

## 2024-01-29 NOTE — ED TRIAGE NOTES
C/o muscle spasms,  neck pain , chest pain and h/a. C/o body aches. Pain in entire head and entire face.  Most pain on left side of face.  Has been having muscle spasms for a long time. - intermittently but worse now.

## 2024-01-29 NOTE — ED NOTES
LOC: The patient is awake and alert; oriented x 3 and speaking appropriately.  APPEARANCE: Patient resting comfortably, patient is clean and well groomed  SKIN: warm and dry, normal skin turgor & moist mucus membranes, skin intact, no breakdown noted.  MUSCULOSKELETAL: Patient moving all extremities well, no obvious swelling or deformities noted, c/o body aches, shoulder  and head pain , facial pain.   RESPIRATORY: Airway is open and patent, respirations are spontaneous, normal effort and rate  CARDIAC: Patient has a normal rate, no peripheral edema noted, capillary refill < 3 seconds; No complaints of chest pain   ABDOMEN: Soft and non tender to palpation, no distention noted.

## 2024-01-29 NOTE — DISCHARGE INSTRUCTIONS
You were seen today for headache and muscle spasm.  You should continue to take your muscle relaxers already prescribed to you at home as needed for your muscle discomfort.  You can take Tylenol or ibuprofen for any headaches in the future as well as for your muscle spasm.  If you develop recurrent headache that is severe and not improved with Tylenol or ibuprofen or any other worsening symptoms or concerns, you should return to the emergency department for re-evaluation.  Please follow-up with your primary care doctor within the next week.

## 2024-01-29 NOTE — ED PROVIDER NOTES
Encounter Date: 1/28/2024       History     Chief Complaint   Patient presents with    Headache     Headache since 1200 today, denies vision changes. Endorses body aches x a week.      HPI  Patient is a 58-year-old female with history of hypertension, hyperlipidemia, diabetes who presents for headache.  Patient states that over the last week she has had muscle soreness in her upper shoulders and across her back and neck.  She states that her muscles feel tight.  She states that she has this frequently and takes medicine for the muscle spasms.  This is not new.  She does state that sometimes she has muscle spasms in her lateral chest wall bilaterally but has no substernal chest pain.  No shortness of breath.  No abdominal pain.  She states that today she developed a headache associated with this.  It was gradual in onset but has been increasing in intensity.  Associated with sensitivity to light but not sound.  Associated with some nausea but no vomiting.  No fevers or chills.  No cough, congestion, rhinorrhea, sore throat.  No urinary symptoms.  She took ibuprofen earlier today with minimal improvement.  She also tried taking her hydroxyzine which she takes for anxiety and sleep without much improvement.  She reports having a headache similar to this in the last few years but is not on any migraine medicines.  No associated vision changes.  No recent head trauma.  No extremity weakness, numbness or paresthesias.  Headache is mostly left-sided.    A video  was used for this entire patient encounter.    Review of patient's allergies indicates:  No Known Allergies  Past Medical History:   Diagnosis Date    Diabetes     HTN (hypertension)     Low serum potassium     Mixed hyperlipidemia      Past Surgical History:   Procedure Laterality Date    HYSTERECTOMY       History reviewed. No pertinent family history.  Social History     Tobacco Use    Smoking status: Never    Smokeless tobacco: Never    Substance Use Topics    Alcohol use: Never    Drug use: Never     Review of Systems  A full review of systems was obtained, see HPI for pertinent positives.    Physical Exam     Initial Vitals [01/28/24 1753]   BP Pulse Resp Temp SpO2   (!) 147/72 65 20 97.8 °F (36.6 °C) 99 %      MAP       --         Physical Exam  Constitutional: No acute distress, well-appearing, nontoxic  HENT: Normocephalic, atraumatic, nares patent, moist mucous membranes, TMs normal bilaterally  Neck: Supple, normal range of motion, no midline tenderness  Eyes:  Pupils equal round and reactive to light, extraocular movements intact, sclera normal  Respiratory: Non-labored  Cardiovascular: Well perfused, normal rate, regular rhythm  Gastrointestinal: Soft, non-tender, non-distended  Integumentary: Warm and dry  Musculoskeletal: No deformity, muscle tenderness over the bilateral trapezius muscles, no midline CT or L-spine tenderness  Neurological: Awake and alert, 5/5 motor and sensation light touch intact in all extremities, cranial nerves 2-12 grossly intact  Psychiatric: Cooperative     ED Course   Procedures  Labs Reviewed   CBC W/ AUTO DIFFERENTIAL - Abnormal; Notable for the following components:       Result Value    MCV 79 (*)     MCH 26.6 (*)     All other components within normal limits   COMPREHENSIVE METABOLIC PANEL - Abnormal; Notable for the following components:    Potassium 3.2 (*)     Glucose 121 (*)     All other components within normal limits   HIV 1 / 2 ANTIBODY    Narrative:     Release to patient->Immediate   HEPATITIS C ANTIBODY    Narrative:     Release to patient->Immediate   TROPONIN I   SARS-COV2 (COVID) WITH FLU/RSV BY PCR          Imaging Results              X-Ray Chest PA And Lateral (Final result)  Result time 01/28/24 20:36:43      Final result by Ji Quintana DO (01/28/24 20:36:43)                   Impression:      No acute abnormality.      Electronically signed by: Ji  Lilian  Date:    01/28/2024  Time:    20:36               Narrative:    EXAMINATION:  XR CHEST PA AND LATERAL    CLINICAL HISTORY:  Other chest pain    TECHNIQUE:  PA and lateral views of the chest were performed.    COMPARISON:  09/07/2012.    FINDINGS:  The lungs are well expanded and clear.  No focal opacities are seen.  The pleural spaces are clear.  The cardiac silhouette is unremarkable.  Osseous structures are intact.                                       CT Head Without Contrast (Final result)  Result time 01/28/24 20:00:56      Final result by Raul Steele MD (01/28/24 20:00:56)                   Impression:      No evidence of acute intracranial pathology.    Electronically signed by resident: Ashley Soriano  Date:    01/28/2024  Time:    19:46    Electronically signed by: Raul Steele MD  Date:    01/28/2024  Time:    20:00               Narrative:    EXAMINATION:  CT HEAD WITHOUT CONTRAST    CLINICAL HISTORY:  Headache, chronic, new features or increased frequency;    TECHNIQUE:  Low dose axial CT images obtained throughout the head without the use of intravenous contrast.  Axial, sagittal and coronal reconstructions were performed.    COMPARISON:  MRI brain 12/08/2017    FINDINGS:  Evaluation mildly limited by patient motion.    Ventricles and sulci are normal in size for age without evidence of hydrocephalus, noting cavum septum pellucidum.  Mild patchy hypoattenuation of the supratentorial white matter likely sequela of chronic microvascular ischemic change.    No parenchymal mass, hemorrhage, edema or major vascular distribution infarct.    No extra-axial blood or fluid collections.    Skull/extracranial contents (limited evaluation):    No acute fracture.  Paranasal sinuses and mastoid air cells are essentially clear.  Imaged portions of the orbits are within normal limits.    Calcific atherosclerosis of the ICA supraclinoid segments bilaterally.                                       Medications    sodium chloride 0.9% bolus 1,000 mL 1,000 mL (0 mLs Intravenous Stopped 1/28/24 2045)   ketorolac injection 15 mg (15 mg Intravenous Given 1/28/24 1947)   prochlorperazine injection Soln 10 mg (10 mg Intravenous Given 1/28/24 1959)   diphenhydrAMINE injection 12.5 mg (12.5 mg Intravenous Given 1/28/24 1948)     Medical Decision Making  Patient is a 58-year-old female who presents for headache.  Gradual onset but patient does not have frequent headache history.  She does report having headache similar to this previously.  She was neurologically intact.  No vision changes.  Will provide headache cocktail.  Given that the patient does not have significant history of migraines, will obtain CT head to assess for intracranial causes for headache.  She was also having muscle spasm and reports upper shoulder and neck pain.  She has no midline tenderness in her neck and her neck is supple.  She does have trapezius tenderness bilaterally.  Suspect that she has tension type headache from the muscle spasms.  She reports that these muscle spasms are frequent.  She does not have any cough or other URI symptoms and she does not seem to have diffuse myalgias.  Less suspicion for viral etiology but will check for COVID and influenza.  Will also check basic labs.  Patient reports that the pain is also in her lateral ribcage when she moves and I feel it is more musculoskeletal in etiology as it seems reproducible however will check cardiac labs.  Plan to treat with headache cocktail and reassess.    On re-evaluation, patient reports significant improvement.  She states that her headache is much better.  She feels comfortable with discharge home.  Her workup is reassuring.  I suspect that she likely has tension type headache from the muscle strain and spasm that she was experiencing.  Patient already has muscle relaxers prescribed to her by her primary care doctor for similar discomfort.  Her repeat neuro exam is normal.  She was  given strict return precautions prior to discharge and advised to follow up with her primary care doctor.    Amount and/or Complexity of Data Reviewed  Labs: ordered.  Radiology: ordered.    Risk  Prescription drug management.               ED Course as of 01/29/24 0115   Sun Jan 28, 2024 1930 EKG with sinus rhythm, rate 62, no STEMI. [NN]   2104 Imaging reassuring [NN]   2143 I re-evaluated the patient.  Patient states that her headache is much better.  She feels comfortable discharge home.  Workup is reassuring.  She was counseled on return precautions prior to discharge.  She was instructed to follow up with her primary care doctor. [NN]      ED Course User Index  [NN] Cristina Shafer MD                           Clinical Impression:  Final diagnoses:  [R51.9] Headache  [R07.89] Chest wall pain          ED Disposition Condition    Discharge Stable          ED Prescriptions    None       Follow-up Information       Follow up With Specialties Details Why Contact Info    Regino Love, FNP Otolaryngology Schedule an appointment as soon as possible for a visit   [address]  [address]  [facility] [id number] [phone]      [facility] - Emergency Dept Emergency Medicine  As needed, If symptoms worsen [address] [facility] [address] [phone]             Cristina Shafer MD  01/29/24 0117

## 2024-08-12 ENCOUNTER — HOSPITAL ENCOUNTER (EMERGENCY)
Facility: HOSPITAL | Age: 59
Discharge: HOME | End: 2024-08-12
Attending: EMERGENCY MEDICINE
Payer: MEDICAID

## 2024-08-12 VITALS
TEMPERATURE: 98 F | HEART RATE: 68 BPM | HEIGHT: 64 IN | DIASTOLIC BLOOD PRESSURE: 77 MMHG | BODY MASS INDEX: 29.71 KG/M2 | RESPIRATION RATE: 18 BRPM | SYSTOLIC BLOOD PRESSURE: 158 MMHG | WEIGHT: 174 LBS | OXYGEN SATURATION: 98 %

## 2024-08-12 DIAGNOSIS — M94.0 COSTOCHONDRITIS, ACUTE: Primary | ICD-10-CM

## 2024-08-12 DIAGNOSIS — R07.9 CHEST PAIN: ICD-10-CM

## 2024-08-12 LAB
ALBUMIN SERPL BCP-MCNC: 4.1 G/DL (ref 3.5–5.2)
ALP SERPL-CCNC: 107 U/L (ref 55–135)
ALT SERPL W/O P-5'-P-CCNC: 18 U/L (ref 10–44)
ANION GAP SERPL CALC-SCNC: 11 MMOL/L (ref 8–16)
AST SERPL-CCNC: 22 U/L (ref 10–40)
BASOPHILS # BLD AUTO: 0.05 K/UL (ref 0–0.2)
BASOPHILS NFR BLD: 0.8 % (ref 0–1.9)
BILIRUB SERPL-MCNC: 0.3 MG/DL (ref 0.1–1)
BUN SERPL-MCNC: 17 MG/DL (ref 6–20)
CALCIUM SERPL-MCNC: 10 MG/DL (ref 8.7–10.5)
CHLORIDE SERPL-SCNC: 102 MMOL/L (ref 95–110)
CO2 SERPL-SCNC: 26 MMOL/L (ref 23–29)
CREAT SERPL-MCNC: 0.7 MG/DL (ref 0.5–1.4)
DIFFERENTIAL METHOD BLD: ABNORMAL
EOSINOPHIL # BLD AUTO: 0.1 K/UL (ref 0–0.5)
EOSINOPHIL NFR BLD: 2.3 % (ref 0–8)
ERYTHROCYTE [DISTWIDTH] IN BLOOD BY AUTOMATED COUNT: 14.1 % (ref 11.5–14.5)
EST. GFR  (NO RACE VARIABLE): >60 ML/MIN/1.73 M^2
GLUCOSE SERPL-MCNC: 92 MG/DL (ref 70–110)
HCT VFR BLD AUTO: 40 % (ref 37–48.5)
HGB BLD-MCNC: 12.9 G/DL (ref 12–16)
IMM GRANULOCYTES # BLD AUTO: 0.02 K/UL (ref 0–0.04)
IMM GRANULOCYTES NFR BLD AUTO: 0.3 % (ref 0–0.5)
LYMPHOCYTES # BLD AUTO: 2.3 K/UL (ref 1–4.8)
LYMPHOCYTES NFR BLD: 36.9 % (ref 18–48)
MCH RBC QN AUTO: 26 PG (ref 27–31)
MCHC RBC AUTO-ENTMCNC: 32.3 G/DL (ref 32–36)
MCV RBC AUTO: 81 FL (ref 82–98)
MONOCYTES # BLD AUTO: 0.5 K/UL (ref 0.3–1)
MONOCYTES NFR BLD: 8.7 % (ref 4–15)
NEUTROPHILS # BLD AUTO: 3.2 K/UL (ref 1.8–7.7)
NEUTROPHILS NFR BLD: 51 % (ref 38–73)
NRBC BLD-RTO: 0 /100 WBC
OHS QRS DURATION: 70 MS
OHS QTC CALCULATION: 431 MS
PLATELET # BLD AUTO: 211 K/UL (ref 150–450)
PMV BLD AUTO: 11.9 FL (ref 9.2–12.9)
POCT GLUCOSE: 87 MG/DL (ref 70–110)
POTASSIUM SERPL-SCNC: 3.6 MMOL/L (ref 3.5–5.1)
PROT SERPL-MCNC: 7.6 G/DL (ref 6–8.4)
RBC # BLD AUTO: 4.96 M/UL (ref 4–5.4)
SODIUM SERPL-SCNC: 139 MMOL/L (ref 136–145)
TROPONIN I SERPL DL<=0.01 NG/ML-MCNC: <0.006 NG/ML (ref 0–0.03)
WBC # BLD AUTO: 6.18 K/UL (ref 3.9–12.7)

## 2024-08-12 PROCEDURE — 85025 COMPLETE CBC W/AUTO DIFF WBC: CPT | Performed by: EMERGENCY MEDICINE

## 2024-08-12 PROCEDURE — 96375 TX/PRO/DX INJ NEW DRUG ADDON: CPT

## 2024-08-12 PROCEDURE — 93010 ELECTROCARDIOGRAM REPORT: CPT | Mod: ,,, | Performed by: INTERNAL MEDICINE

## 2024-08-12 PROCEDURE — 96374 THER/PROPH/DIAG INJ IV PUSH: CPT

## 2024-08-12 PROCEDURE — 82962 GLUCOSE BLOOD TEST: CPT

## 2024-08-12 PROCEDURE — 93005 ELECTROCARDIOGRAM TRACING: CPT

## 2024-08-12 PROCEDURE — 25000003 PHARM REV CODE 250: Performed by: EMERGENCY MEDICINE

## 2024-08-12 PROCEDURE — 80053 COMPREHEN METABOLIC PANEL: CPT | Performed by: EMERGENCY MEDICINE

## 2024-08-12 PROCEDURE — 84484 ASSAY OF TROPONIN QUANT: CPT | Performed by: EMERGENCY MEDICINE

## 2024-08-12 PROCEDURE — 63600175 PHARM REV CODE 636 W HCPCS: Performed by: EMERGENCY MEDICINE

## 2024-08-12 PROCEDURE — 99285 EMERGENCY DEPT VISIT HI MDM: CPT | Mod: 25

## 2024-08-12 RX ORDER — KETOROLAC TROMETHAMINE 30 MG/ML
15 INJECTION, SOLUTION INTRAMUSCULAR; INTRAVENOUS
Status: COMPLETED | OUTPATIENT
Start: 2024-08-12 | End: 2024-08-12

## 2024-08-12 RX ORDER — IBUPROFEN 600 MG/1
600 TABLET ORAL EVERY 6 HOURS PRN
Qty: 20 TABLET | Refills: 0 | Status: SHIPPED | OUTPATIENT
Start: 2024-08-12

## 2024-08-12 RX ORDER — MORPHINE SULFATE 4 MG/ML
4 INJECTION, SOLUTION INTRAMUSCULAR; INTRAVENOUS
Status: COMPLETED | OUTPATIENT
Start: 2024-08-12 | End: 2024-08-12

## 2024-08-12 RX ORDER — FAMOTIDINE 10 MG/ML
20 INJECTION INTRAVENOUS
Status: COMPLETED | OUTPATIENT
Start: 2024-08-12 | End: 2024-08-12

## 2024-08-12 RX ORDER — ONDANSETRON HYDROCHLORIDE 2 MG/ML
4 INJECTION, SOLUTION INTRAVENOUS
Status: COMPLETED | OUTPATIENT
Start: 2024-08-12 | End: 2024-08-12

## 2024-08-12 RX ADMIN — KETOROLAC TROMETHAMINE 15 MG: 30 INJECTION, SOLUTION INTRAMUSCULAR; INTRAVENOUS at 01:08

## 2024-08-12 RX ADMIN — ONDANSETRON 4 MG: 2 INJECTION INTRAMUSCULAR; INTRAVENOUS at 02:08

## 2024-08-12 RX ADMIN — MORPHINE SULFATE 4 MG: 4 INJECTION INTRAVENOUS at 02:08

## 2024-08-12 RX ADMIN — FAMOTIDINE 20 MG: 10 INJECTION, SOLUTION INTRAVENOUS at 02:08

## 2024-08-12 NOTE — ED PROVIDER NOTES
Chief complaint:  Chest Pain (Pt complaining of chest pain that started at 11pm and states her blood pressure at home was reading high. Pt denies SOB, nausea, vomiting)      HPI:  Lita Culver is a 58 y.o. female presenting with acute onset of left-sided chest pain that has been going on since around 8:00 a.m. tonight.  She says the pain is a sharp discomfort that comes and goes and lasts for only a few seconds.  The pain is worse with movement of her chest.  The pain is reproducible with palpation.  No shortness of breath.  No fevers or chills.  No cough or congestion    ROS: As per HPI and below:  Constitutional:  No fevers, no chills  Cardiac:  chest pain  Respiratory: no shortness of breath  Abdominal: no abdominal pain, no nausea, no vomiting  Neuro: no focal numbness, no focal weakness        Review of patient's allergies indicates:  No Known Allergies    No current facility-administered medications on file prior to encounter.     Current Outpatient Medications on File Prior to Encounter   Medication Sig Dispense Refill    atorvastatin (LIPITOR) 40 MG tablet Take 40 mg by mouth once daily.      cetirizine 10 mg chewable tablet Take 10 mg by mouth once daily.      chlorthalidone (HYGROTEN) 50 MG Tab Take 25 mg by mouth once daily.      fluticasone propionate (FLOVENT DISKUS) 50 mcg/actuation DsDv Inhale into the lungs. Controller      hydroCHLOROthiazide (HYDRODIURIL) 12.5 MG Tab Take 12.5 mg by mouth once daily.      hydrOXYzine (ATARAX) 50 MG tablet Take 50 mg by mouth 4 (four) times daily.      loratadine (CLARITIN) 10 mg tablet Take 10 mg by mouth once daily.      meclizine (ANTIVERT) 25 mg tablet Take 25 mg by mouth 3 (three) times daily as needed.      meloxicam (MOBIC) 7.5 MG tablet Take 7.5 mg by mouth once daily.      metFORMIN (GLUCOPHAGE) 500 MG tablet Take 500 mg by mouth 2 (two) times daily with meals.      methocarbamoL (ROBAXIN) 750 MG Tab Take 500 mg by mouth 4 (four) times daily as needed.       POTASSIUM CHLORIDE ORAL Take 10 mEq by mouth once daily.      sertraline (ZOLOFT) 50 MG tablet Take 50 mg by mouth once daily.         PMH:  As per HPI and below:  Past Medical History:   Diagnosis Date    Diabetes     HTN (hypertension)     Low serum potassium     Mixed hyperlipidemia      Past Surgical History:   Procedure Laterality Date    HYSTERECTOMY         Social History     Socioeconomic History    Marital status: Single   Tobacco Use    Smoking status: Never    Smokeless tobacco: Never   Substance and Sexual Activity    Alcohol use: Never    Drug use: Never       No family history on file.    Physical Exam:    Vitals:    08/12/24 0059   BP: (!) 158/77   Pulse: 68   Resp: 18   Temp: 98 °F (36.7 °C)     Constitutional: Well-nourished, well-developed, in no acute distress, not cachectic  Eyes: PERRLA, EOMI, normal conjunctiva, normal sclera  ENT: Moist Mucous membranes  Respiratory: Clear to auscultation bilaterally, no wheezes, no crackles, no rhonchi  Cardiovascular: Regular rate and rhythm, no murmurs, no rubs, no gallops  Abdominal: Soft, nontender, nondistended, no guarding, no rebound  Musculoskeletal: Normal range of motion, no obvious deformity, normal capillary refill, head atraumatic, neck supple, no meningismus, tenderness to palpation to left anterior chest wall reproducing pain  Skin: no rash, no ecchymosis, no errythema, no discharge  Neurologic: Cranial nerves II through XII intact, no motor deficits, no sensory deficits, no cerebellar deficits  Psychological: Alert, oriented x3, normal affect, normal mood    Orders Placed This Encounter   Procedures    X-Ray Chest AP Portable    CBC auto differential    Comprehensive metabolic panel    Troponin I    EKG 12-lead    Insert Saline lock IV       Medications   ketorolac injection 15 mg (15 mg Intravenous Given 8/12/24 0134)         Labs Reviewed   CBC W/ AUTO DIFFERENTIAL - Abnormal       Result Value    WBC 6.18      RBC 4.96      Hemoglobin  12.9      Hematocrit 40.0      MCV 81 (*)     MCH 26.0 (*)     MCHC 32.3      RDW 14.1      Platelets 211      MPV 11.9      Immature Granulocytes 0.3      Gran # (ANC) 3.2      Immature Grans (Abs) 0.02      Lymph # 2.3      Mono # 0.5      Eos # 0.1      Baso # 0.05      nRBC 0      Gran % 51.0      Lymph % 36.9      Mono % 8.7      Eosinophil % 2.3      Basophil % 0.8      Differential Method Automated     COMPREHENSIVE METABOLIC PANEL    Sodium 139      Potassium 3.6      Chloride 102      CO2 26      Glucose 92      BUN 17      Creatinine 0.7      Calcium 10.0      Total Protein 7.6      Albumin 4.1      Total Bilirubin 0.3      Alkaline Phosphatase 107      AST 22      ALT 18      eGFR >60.0      Anion Gap 11     TROPONIN I    Troponin I <0.006         X-Ray Chest AP Portable   Final Result      No acute findings in the chest.         Electronically signed by: Nick Moncada MD   Date:    08/12/2024   Time:    01:58          Medical Decision Making  Differential diagnosis includes chest wall pain, costochondritis, life-threatening ACS, CHF, pneumonia     Patient presents with acute onset of sharp chest pain on the left costochondral area that is reproducible.  She is quite concerned however of a possible cardiac etiology as well as her elevated blood pressure.  Will perform screening cardiac enzymes since her pain has been present since around 8:00 a.m. tonight and give Toradol.  Will likely discharged home    Patient's cardiac enzymes are unremarkable.  Chest x-ray and EKG are also unremarkable.  Given that she has completely reproducible pain.  I will discharge home with ibuprofen.    Amount and/or Complexity of Data Reviewed  Labs: ordered.  Radiology: ordered and independent interpretation performed.     Details: Chest x-ray: No acute process  ECG/medicine tests: ordered and independent interpretation performed.     Details: EKG: Normal sinus rhythm at 66, normal axis, normal intervals, no ST elevation or  depression    Risk  Prescription drug management.      Procedures          ASSESSMENT  1. Costochondritis, acute    2. Chest pain          Disposition:  Discharged home in stable condition    New Prescriptions    IBUPROFEN (ADVIL,MOTRIN) 600 MG TABLET    Take 1 tablet (600 mg total) by mouth every 6 (six) hours as needed for Pain.     Modified Medications    No medications on file     Discontinued Medications    No medications on file          Yoshi Harry III, MD  08/12/24 4553

## 2024-08-12 NOTE — ED NOTES
Patient identifiers for Lita Culver checked and correct.  LOC: Patient is awake, alert, and aware of environment with an appropriate affect. Patient is oriented x 3 and speaking appropriately.  APPEARANCE: Patient resting comfortably and in no acute distress. Patient is clean and well groomed, patient's clothing is properly fastened.  SKIN: The skin is warm and dry. Patient has normal skin turgor and moist mucus membrances. Skin is intact; no bruising or breakdown noted.  MUSKULOSKELETAL: Patient is moving all extremities well, no obvious deformities noted. Pulses intact.   RESPIRATORY: Airway is open and patent. Respirations are spontaneous and non-labored with normal effort and rate.  CARDIAC: Patient has a normal rate and rhythm. No peripheral edema noted. Capillary refill < 3 seconds.  ABDOMEN: No distention noted. Bowel sounds active in all 4 quadrants. Soft and non-tender upon palpation. Endorses chest pain to the left anterior chest wall.  NEUROLOGICAL: PERRL. Facial expression is symmetrical. Hand grasps are equal bilaterally. Normal sensation in all extremities when touched with finger.  Allergies reported: Review of patient's allergies indicates:  No Known Allergies

## 2024-08-12 NOTE — ED TRIAGE NOTES
Lita Culver, a 58 y.o. female presents to the ED w/ complaint of CP that started around 11PM and states that her BP at home was high. Denies SOB, N/V    Triage note:  Chief Complaint   Patient presents with    Chest Pain     Pt complaining of chest pain that started at 11pm and states her blood pressure at home was reading high. Pt denies SOB, nausea, vomiting     Review of patient's allergies indicates:  No Known Allergies  Past Medical History:   Diagnosis Date    Diabetes     HTN (hypertension)     Low serum potassium     Mixed hyperlipidemia

## 2024-08-19 ENCOUNTER — HOSPITAL ENCOUNTER (OUTPATIENT)
Facility: HOSPITAL | Age: 59
Discharge: HOME | End: 2024-08-20
Attending: EMERGENCY MEDICINE | Admitting: EMERGENCY MEDICINE
Payer: MEDICAID

## 2024-08-19 DIAGNOSIS — R07.89 CHEST WALL PAIN: ICD-10-CM

## 2024-08-19 DIAGNOSIS — R00.2 PALPITATIONS: ICD-10-CM

## 2024-08-19 DIAGNOSIS — E87.6 HYPOKALEMIA: ICD-10-CM

## 2024-08-19 DIAGNOSIS — R07.9 CHEST PAIN: Primary | ICD-10-CM

## 2024-08-19 PROBLEM — J01.90 ACUTE RHINOSINUSITIS: Status: ACTIVE | Noted: 2018-08-15

## 2024-08-19 PROBLEM — B35.1 ONYCHOMYCOSIS OF TOENAIL: Status: ACTIVE | Noted: 2019-08-02

## 2024-08-19 PROBLEM — B35.3 TINEA PEDIS: Status: ACTIVE | Noted: 2023-07-24

## 2024-08-19 PROBLEM — E11.9 TYPE 2 DIABETES MELLITUS: Status: ACTIVE | Noted: 2020-07-02

## 2024-08-19 PROBLEM — R42 VERTIGO: Status: ACTIVE | Noted: 2017-06-16

## 2024-08-19 PROBLEM — M25.562 ARTHRALGIA OF BOTH KNEES: Status: ACTIVE | Noted: 2019-04-25

## 2024-08-19 PROBLEM — M25.561 ARTHRALGIA OF BOTH KNEES: Status: ACTIVE | Noted: 2019-04-25

## 2024-08-19 PROBLEM — I10 ESSENTIAL HYPERTENSION: Status: ACTIVE | Noted: 2023-03-06

## 2024-08-19 PROBLEM — E11.42 DIABETIC PERIPHERAL NEUROPATHY ASSOCIATED WITH TYPE 2 DIABETES MELLITUS: Status: ACTIVE | Noted: 2022-08-15

## 2024-08-19 PROBLEM — E78.5 HYPERLIPIDEMIA: Status: ACTIVE | Noted: 2018-04-19

## 2024-08-19 PROBLEM — K29.70 GASTRITIS: Status: ACTIVE | Noted: 2018-12-11

## 2024-08-19 PROBLEM — F41.9 ANXIETY: Status: ACTIVE | Noted: 2023-03-06

## 2024-08-19 PROBLEM — N95.1 MENOPAUSAL SYNDROME: Status: ACTIVE | Noted: 2020-03-01

## 2024-08-19 LAB
ALBUMIN SERPL BCP-MCNC: 4 G/DL (ref 3.5–5.2)
ALP SERPL-CCNC: 86 U/L (ref 55–135)
ALT SERPL W/O P-5'-P-CCNC: 17 U/L (ref 10–44)
ANION GAP SERPL CALC-SCNC: 11 MMOL/L (ref 8–16)
AST SERPL-CCNC: 19 U/L (ref 10–40)
BASOPHILS # BLD AUTO: 0.03 K/UL (ref 0–0.2)
BASOPHILS NFR BLD: 0.6 % (ref 0–1.9)
BILIRUB SERPL-MCNC: 0.3 MG/DL (ref 0.1–1)
BNP SERPL-MCNC: 22 PG/ML (ref 0–99)
BUN SERPL-MCNC: 27 MG/DL (ref 6–20)
CALCIUM SERPL-MCNC: 9.8 MG/DL (ref 8.7–10.5)
CHLORIDE SERPL-SCNC: 101 MMOL/L (ref 95–110)
CO2 SERPL-SCNC: 25 MMOL/L (ref 23–29)
CREAT SERPL-MCNC: 0.8 MG/DL (ref 0.5–1.4)
D DIMER PPP IA.FEU-MCNC: <0.19 MG/L FEU
DIFFERENTIAL METHOD BLD: ABNORMAL
EOSINOPHIL # BLD AUTO: 0.1 K/UL (ref 0–0.5)
EOSINOPHIL NFR BLD: 2.2 % (ref 0–8)
ERYTHROCYTE [DISTWIDTH] IN BLOOD BY AUTOMATED COUNT: 14.1 % (ref 11.5–14.5)
EST. GFR  (NO RACE VARIABLE): >60 ML/MIN/1.73 M^2
GLUCOSE SERPL-MCNC: 109 MG/DL (ref 70–110)
HCT VFR BLD AUTO: 38.5 % (ref 37–48.5)
HGB BLD-MCNC: 12.9 G/DL (ref 12–16)
IMM GRANULOCYTES # BLD AUTO: 0.02 K/UL (ref 0–0.04)
IMM GRANULOCYTES NFR BLD AUTO: 0.4 % (ref 0–0.5)
LYMPHOCYTES # BLD AUTO: 2 K/UL (ref 1–4.8)
LYMPHOCYTES NFR BLD: 38 % (ref 18–48)
MCH RBC QN AUTO: 26.5 PG (ref 27–31)
MCHC RBC AUTO-ENTMCNC: 33.5 G/DL (ref 32–36)
MCV RBC AUTO: 79 FL (ref 82–98)
MONOCYTES # BLD AUTO: 0.4 K/UL (ref 0.3–1)
MONOCYTES NFR BLD: 8 % (ref 4–15)
NEUTROPHILS # BLD AUTO: 2.7 K/UL (ref 1.8–7.7)
NEUTROPHILS NFR BLD: 50.8 % (ref 38–73)
NRBC BLD-RTO: 0 /100 WBC
PLATELET # BLD AUTO: 200 K/UL (ref 150–450)
PMV BLD AUTO: 12.1 FL (ref 9.2–12.9)
POTASSIUM SERPL-SCNC: 3 MMOL/L (ref 3.5–5.1)
PROT SERPL-MCNC: 7.5 G/DL (ref 6–8.4)
RBC # BLD AUTO: 4.87 M/UL (ref 4–5.4)
SODIUM SERPL-SCNC: 137 MMOL/L (ref 136–145)
TROPONIN I SERPL DL<=0.01 NG/ML-MCNC: <0.006 NG/ML (ref 0–0.03)
WBC # BLD AUTO: 5.37 K/UL (ref 3.9–12.7)

## 2024-08-19 PROCEDURE — 99285 EMERGENCY DEPT VISIT HI MDM: CPT | Mod: 25

## 2024-08-19 PROCEDURE — 83880 ASSAY OF NATRIURETIC PEPTIDE: CPT | Performed by: STUDENT IN AN ORGANIZED HEALTH CARE EDUCATION/TRAINING PROGRAM

## 2024-08-19 PROCEDURE — 63600175 PHARM REV CODE 636 W HCPCS: Performed by: PHYSICIAN ASSISTANT

## 2024-08-19 PROCEDURE — 93005 ELECTROCARDIOGRAM TRACING: CPT

## 2024-08-19 PROCEDURE — 85379 FIBRIN DEGRADATION QUANT: CPT | Performed by: PHYSICIAN ASSISTANT

## 2024-08-19 PROCEDURE — 83036 HEMOGLOBIN GLYCOSYLATED A1C: CPT | Performed by: STUDENT IN AN ORGANIZED HEALTH CARE EDUCATION/TRAINING PROGRAM

## 2024-08-19 PROCEDURE — 25000003 PHARM REV CODE 250: Performed by: PHYSICIAN ASSISTANT

## 2024-08-19 PROCEDURE — 96375 TX/PRO/DX INJ NEW DRUG ADDON: CPT

## 2024-08-19 PROCEDURE — 93010 ELECTROCARDIOGRAM REPORT: CPT | Mod: ,,, | Performed by: INTERNAL MEDICINE

## 2024-08-19 PROCEDURE — 80053 COMPREHEN METABOLIC PANEL: CPT | Performed by: STUDENT IN AN ORGANIZED HEALTH CARE EDUCATION/TRAINING PROGRAM

## 2024-08-19 PROCEDURE — 84484 ASSAY OF TROPONIN QUANT: CPT | Performed by: STUDENT IN AN ORGANIZED HEALTH CARE EDUCATION/TRAINING PROGRAM

## 2024-08-19 PROCEDURE — 85025 COMPLETE CBC W/AUTO DIFF WBC: CPT | Performed by: STUDENT IN AN ORGANIZED HEALTH CARE EDUCATION/TRAINING PROGRAM

## 2024-08-19 PROCEDURE — 96361 HYDRATE IV INFUSION ADD-ON: CPT

## 2024-08-19 RX ORDER — KETOROLAC TROMETHAMINE 30 MG/ML
10 INJECTION, SOLUTION INTRAMUSCULAR; INTRAVENOUS
Status: COMPLETED | OUTPATIENT
Start: 2024-08-19 | End: 2024-08-19

## 2024-08-19 RX ORDER — METHOCARBAMOL 500 MG/1
1000 TABLET, FILM COATED ORAL
Status: COMPLETED | OUTPATIENT
Start: 2024-08-19 | End: 2024-08-19

## 2024-08-19 RX ADMIN — KETOROLAC TROMETHAMINE 10 MG: 30 INJECTION, SOLUTION INTRAMUSCULAR; INTRAVENOUS at 09:08

## 2024-08-19 RX ADMIN — POTASSIUM BICARBONATE 40 MEQ: 391 TABLET, EFFERVESCENT ORAL at 09:08

## 2024-08-19 RX ADMIN — SODIUM CHLORIDE, POTASSIUM CHLORIDE, SODIUM LACTATE AND CALCIUM CHLORIDE 1000 ML: 600; 310; 30; 20 INJECTION, SOLUTION INTRAVENOUS at 09:08

## 2024-08-19 RX ADMIN — METHOCARBAMOL 1000 MG: 500 TABLET ORAL at 09:08

## 2024-08-19 NOTE — Clinical Note
"Lita Reedersaqib Culver was seen and treated in our emergency department on 8/19/2024.  She may return to work on 08/23/2024.       If you have any questions or concerns, please don't hesitate to call.      Tasneem Shea PA-C"

## 2024-08-19 NOTE — Clinical Note
Diagnosis: Chest pain [728450]   Future Attending Provider: HELEN MCMAHON [[id number]]   Is the patient being sent to ED Observation?: Yes

## 2024-08-20 VITALS
HEART RATE: 65 BPM | WEIGHT: 173 LBS | TEMPERATURE: 98 F | HEIGHT: 64 IN | OXYGEN SATURATION: 98 % | RESPIRATION RATE: 18 BRPM | SYSTOLIC BLOOD PRESSURE: 127 MMHG | DIASTOLIC BLOOD PRESSURE: 68 MMHG | BODY MASS INDEX: 29.53 KG/M2

## 2024-08-20 PROBLEM — M94.0 COSTOCHONDRITIS: Status: ACTIVE | Noted: 2024-08-20

## 2024-08-20 PROBLEM — R07.9 CHEST PAIN: Status: ACTIVE | Noted: 2024-08-20

## 2024-08-20 LAB
ANION GAP SERPL CALC-SCNC: 6 MMOL/L (ref 8–16)
ANION GAP SERPL CALC-SCNC: 9 MMOL/L (ref 8–16)
ASCENDING AORTA: 3.56 CM
BSA FOR ECHO PROCEDURE: 1.88 M2
BUN SERPL-MCNC: 18 MG/DL (ref 6–20)
BUN SERPL-MCNC: 20 MG/DL (ref 6–20)
CALCIUM SERPL-MCNC: 8.6 MG/DL (ref 8.7–10.5)
CALCIUM SERPL-MCNC: 9.4 MG/DL (ref 8.7–10.5)
CHLORIDE SERPL-SCNC: 100 MMOL/L (ref 95–110)
CHLORIDE SERPL-SCNC: 106 MMOL/L (ref 95–110)
CO2 SERPL-SCNC: 27 MMOL/L (ref 23–29)
CO2 SERPL-SCNC: 29 MMOL/L (ref 23–29)
CREAT SERPL-MCNC: 0.6 MG/DL (ref 0.5–1.4)
CREAT SERPL-MCNC: 0.7 MG/DL (ref 0.5–1.4)
CV ECHO LV RWT: 0.35 CM
CV STRESS BASE HR: 64 BPM
DIASTOLIC BLOOD PRESSURE: 82 MMHG
DOP CALC LVOT AREA: 3.5 CM2
DOP CALC LVOT DIAMETER: 2.12 CM
DOP CALC LVOT PEAK VEL: 0.81 M/S
DOP CALC LVOT STROKE VOLUME: 65.2 CM3
DOP CALCLVOT PEAK VEL VTI: 18.48 CM
E WAVE DECELERATION TIME: 238.61 MSEC
E/A RATIO: 0.76
E/E' RATIO: 6.63 M/S
ECHO LV POSTERIOR WALL: 0.8 CM (ref 0.6–1.1)
EST. GFR  (NO RACE VARIABLE): >60 ML/MIN/1.73 M^2
EST. GFR  (NO RACE VARIABLE): >60 ML/MIN/1.73 M^2
ESTIMATED AVG GLUCOSE: 126 MG/DL (ref 68–131)
FRACTIONAL SHORTENING: 37 % (ref 28–44)
GLUCOSE SERPL-MCNC: 109 MG/DL (ref 70–110)
GLUCOSE SERPL-MCNC: 94 MG/DL (ref 70–110)
GLUCOSE SERPL-MCNC: 97 MG/DL (ref 70–110)
HBA1C MFR BLD: 6 % (ref 4–5.6)
INTERVENTRICULAR SEPTUM: 0.78 CM (ref 0.6–1.1)
LA MAJOR: 4.16 CM
LA MINOR: 4.61 CM
LA WIDTH: 3.97 CM
LEFT ATRIUM SIZE: 2.84 CM
LEFT ATRIUM VOLUME INDEX MOD: 21.7 ML/M2
LEFT ATRIUM VOLUME INDEX: 22.8 ML/M2
LEFT ATRIUM VOLUME MOD: 40 CM3
LEFT ATRIUM VOLUME: 41.91 CM3
LEFT INTERNAL DIMENSION IN SYSTOLE: 2.85 CM (ref 2.1–4)
LEFT VENTRICLE DIASTOLIC VOLUME INDEX: 50.89 ML/M2
LEFT VENTRICLE DIASTOLIC VOLUME: 93.64 ML
LEFT VENTRICLE MASS INDEX: 61 G/M2
LEFT VENTRICLE SYSTOLIC VOLUME INDEX: 16.8 ML/M2
LEFT VENTRICLE SYSTOLIC VOLUME: 30.87 ML
LEFT VENTRICULAR INTERNAL DIMENSION IN DIASTOLE: 4.52 CM (ref 3.5–6)
LEFT VENTRICULAR MASS: 112.62 G
LV LATERAL E/E' RATIO: 5.89 M/S
LV SEPTAL E/E' RATIO: 7.57 M/S
MAGNESIUM SERPL-MCNC: 1.8 MG/DL (ref 1.6–2.6)
MV A" WAVE DURATION": 13.7 MSEC
MV PEAK A VEL: 0.7 M/S
MV PEAK E VEL: 0.53 M/S
MV STENOSIS PRESSURE HALF TIME: 69.2 MS
MV VALVE AREA P 1/2 METHOD: 3.18 CM2
OHS CV CPX 1 MINUTE RECOVERY HEART RATE: 88 BPM
OHS CV CPX 85 PERCENT MAX PREDICTED HEART RATE MALE: 136
OHS CV CPX ESTIMATED METS: 9
OHS CV CPX MAX PREDICTED HEART RATE: 160
OHS CV CPX PATIENT IS FEMALE: 1
OHS CV CPX PATIENT IS MALE: 0
OHS CV CPX PEAK DIASTOLIC BLOOD PRESSURE: 80 MMHG
OHS CV CPX PEAK HEAR RATE: 136 BPM
OHS CV CPX PEAK RATE PRESSURE PRODUCT: NORMAL
OHS CV CPX PEAK SYSTOLIC BLOOD PRESSURE: 138 MMHG
OHS CV CPX PERCENT MAX PREDICTED HEART RATE ACHIEVED: 89
OHS CV CPX RATE PRESSURE PRODUCT PRESENTING: 7808
OHS QRS DURATION: 70 MS
OHS QTC CALCULATION: 456 MS
POCT GLUCOSE: 107 MG/DL (ref 70–110)
POTASSIUM SERPL-SCNC: 3 MMOL/L (ref 3.5–5.1)
POTASSIUM SERPL-SCNC: 3.6 MMOL/L (ref 3.5–5.1)
PULM VEIN S/D RATIO: 1.6
PV PEAK D VEL: 0.3 M/S
PV PEAK S VEL: 0.48 M/S
RA MAJOR: 4.51 CM
RA WIDTH: 2.97 CM
RIGHT VENTRICLE DIASTOLIC BASEL DIMENSION: 3 CM
SINUS: 3.49 CM
SODIUM SERPL-SCNC: 138 MMOL/L (ref 136–145)
SODIUM SERPL-SCNC: 139 MMOL/L (ref 136–145)
STJ: 3.15 CM
STRESS ECHO POST EXERCISE DUR MIN: 5 MINUTES
STRESS ECHO POST EXERCISE DUR SEC: 57 SECONDS
STRESS ST DEPRESSION: 0.5 MM
SYSTOLIC BLOOD PRESSURE: 122 MMHG
TDI LATERAL: 0.09 M/S
TDI SEPTAL: 0.07 M/S
TDI: 0.08 M/S
TRICUSPID ANNULAR PLANE SYSTOLIC EXCURSION: 1.47 CM
TROPONIN I SERPL DL<=0.01 NG/ML-MCNC: <0.006 NG/ML (ref 0–0.03)
TROPONIN I SERPL DL<=0.01 NG/ML-MCNC: <0.006 NG/ML (ref 0–0.03)
Z-SCORE OF LEFT VENTRICULAR DIMENSION IN END DIASTOLE: -1.22
Z-SCORE OF LEFT VENTRICULAR DIMENSION IN END SYSTOLE: -0.79

## 2024-08-20 PROCEDURE — 82962 GLUCOSE BLOOD TEST: CPT

## 2024-08-20 PROCEDURE — 25000003 PHARM REV CODE 250: Performed by: PHYSICIAN ASSISTANT

## 2024-08-20 PROCEDURE — 96365 THER/PROPH/DIAG IV INF INIT: CPT

## 2024-08-20 PROCEDURE — 96366 THER/PROPH/DIAG IV INF ADDON: CPT

## 2024-08-20 PROCEDURE — 96376 TX/PRO/DX INJ SAME DRUG ADON: CPT

## 2024-08-20 PROCEDURE — 80048 BASIC METABOLIC PNL TOTAL CA: CPT | Performed by: PHYSICIAN ASSISTANT

## 2024-08-20 PROCEDURE — 80048 BASIC METABOLIC PNL TOTAL CA: CPT | Mod: 91 | Performed by: PHYSICIAN ASSISTANT

## 2024-08-20 PROCEDURE — 84484 ASSAY OF TROPONIN QUANT: CPT | Performed by: PHYSICIAN ASSISTANT

## 2024-08-20 PROCEDURE — 83735 ASSAY OF MAGNESIUM: CPT | Performed by: PHYSICIAN ASSISTANT

## 2024-08-20 PROCEDURE — 63600175 PHARM REV CODE 636 W HCPCS: Performed by: PHYSICIAN ASSISTANT

## 2024-08-20 PROCEDURE — G0378 HOSPITAL OBSERVATION PER HR: HCPCS

## 2024-08-20 RX ORDER — LOSARTAN POTASSIUM 25 MG/1
25 TABLET ORAL DAILY
COMMUNITY
End: 2024-08-20

## 2024-08-20 RX ORDER — FAMOTIDINE 20 MG/1
1 TABLET, FILM COATED ORAL 2 TIMES DAILY
COMMUNITY
End: 2024-08-20

## 2024-08-20 RX ORDER — AMLODIPINE BESYLATE 5 MG/1
2.5 TABLET ORAL 2 TIMES DAILY
COMMUNITY
End: 2024-08-20

## 2024-08-20 RX ORDER — METHOCARBAMOL 500 MG/1
1000 TABLET, FILM COATED ORAL 3 TIMES DAILY PRN
Qty: 30 TABLET | Refills: 0 | Status: SHIPPED | OUTPATIENT
Start: 2024-08-20 | End: 2024-08-25

## 2024-08-20 RX ORDER — CHLORTHALIDONE 25 MG/1
25 TABLET ORAL NIGHTLY
Status: DISCONTINUED | OUTPATIENT
Start: 2024-08-20 | End: 2024-08-20 | Stop reason: HOSPADM

## 2024-08-20 RX ORDER — CYCLOBENZAPRINE HCL 10 MG
10 TABLET ORAL
Status: COMPLETED | OUTPATIENT
Start: 2024-08-20 | End: 2024-08-20

## 2024-08-20 RX ORDER — KETOROLAC TROMETHAMINE 10 MG/1
10 TABLET, FILM COATED ORAL EVERY 6 HOURS PRN
Qty: 20 TABLET | Refills: 0 | Status: SHIPPED | OUTPATIENT
Start: 2024-08-20 | End: 2024-08-25

## 2024-08-20 RX ORDER — ALUMINUM HYDROXIDE, MAGNESIUM HYDROXIDE, AND SIMETHICONE 1200; 120; 1200 MG/30ML; MG/30ML; MG/30ML
30 SUSPENSION ORAL EVERY 4 HOURS PRN
Status: DISCONTINUED | OUTPATIENT
Start: 2024-08-20 | End: 2024-08-20 | Stop reason: HOSPADM

## 2024-08-20 RX ORDER — DEXTROSE 40 %
16 GEL (GRAM) ORAL
Status: DISCONTINUED | OUTPATIENT
Start: 2024-08-20 | End: 2024-08-20 | Stop reason: HOSPADM

## 2024-08-20 RX ORDER — ATORVASTATIN CALCIUM 40 MG/1
40 TABLET, FILM COATED ORAL DAILY
Status: DISCONTINUED | OUTPATIENT
Start: 2024-08-20 | End: 2024-08-20 | Stop reason: HOSPADM

## 2024-08-20 RX ORDER — HYDROCODONE BITARTRATE AND ACETAMINOPHEN 5; 325 MG/1; MG/1
1 TABLET ORAL
Status: COMPLETED | OUTPATIENT
Start: 2024-08-20 | End: 2024-08-20

## 2024-08-20 RX ORDER — ONDANSETRON HYDROCHLORIDE 2 MG/ML
4 INJECTION, SOLUTION INTRAVENOUS EVERY 6 HOURS PRN
Status: DISCONTINUED | OUTPATIENT
Start: 2024-08-20 | End: 2024-08-20 | Stop reason: HOSPADM

## 2024-08-20 RX ORDER — IBUPROFEN 200 MG
16 TABLET ORAL
Status: DISCONTINUED | OUTPATIENT
Start: 2024-08-20 | End: 2024-08-20

## 2024-08-20 RX ORDER — KETOROLAC TROMETHAMINE 30 MG/ML
10 INJECTION, SOLUTION INTRAMUSCULAR; INTRAVENOUS EVERY 6 HOURS PRN
Status: DISCONTINUED | OUTPATIENT
Start: 2024-08-20 | End: 2024-08-20 | Stop reason: HOSPADM

## 2024-08-20 RX ORDER — SERTRALINE HYDROCHLORIDE 50 MG/1
50 TABLET, FILM COATED ORAL NIGHTLY
Status: DISCONTINUED | OUTPATIENT
Start: 2024-08-20 | End: 2024-08-20 | Stop reason: HOSPADM

## 2024-08-20 RX ORDER — HYDROXYZINE PAMOATE 25 MG/1
25 CAPSULE ORAL EVERY 8 HOURS PRN
COMMUNITY
End: 2024-08-20

## 2024-08-20 RX ORDER — GLUCAGON 1 MG
1 KIT INJECTION
Status: DISCONTINUED | OUTPATIENT
Start: 2024-08-20 | End: 2024-08-20 | Stop reason: HOSPADM

## 2024-08-20 RX ORDER — ACETAMINOPHEN 500 MG
1000 TABLET ORAL EVERY 8 HOURS PRN
Status: DISCONTINUED | OUTPATIENT
Start: 2024-08-20 | End: 2024-08-20 | Stop reason: HOSPADM

## 2024-08-20 RX ORDER — DEXTROSE 40 %
24 GEL (GRAM) ORAL
Status: DISCONTINUED | OUTPATIENT
Start: 2024-08-20 | End: 2024-08-20 | Stop reason: HOSPADM

## 2024-08-20 RX ORDER — AMLODIPINE BESYLATE 2.5 MG/1
2.5 TABLET ORAL DAILY
COMMUNITY
End: 2024-08-20

## 2024-08-20 RX ORDER — TALC
6 POWDER (GRAM) TOPICAL NIGHTLY PRN
Status: DISCONTINUED | OUTPATIENT
Start: 2024-08-20 | End: 2024-08-20 | Stop reason: HOSPADM

## 2024-08-20 RX ORDER — CYCLOBENZAPRINE HCL 10 MG
5 TABLET ORAL NIGHTLY
COMMUNITY
End: 2024-08-20

## 2024-08-20 RX ORDER — LIDOCAINE 50 MG/G
1 PATCH TOPICAL
Status: DISCONTINUED | OUTPATIENT
Start: 2024-08-20 | End: 2024-08-20 | Stop reason: HOSPADM

## 2024-08-20 RX ORDER — SODIUM CHLORIDE 0.9 % (FLUSH) 0.9 %
10 SYRINGE (ML) INJECTION
Status: DISCONTINUED | OUTPATIENT
Start: 2024-08-20 | End: 2024-08-20 | Stop reason: HOSPADM

## 2024-08-20 RX ORDER — IPRATROPIUM BROMIDE 21 UG/1
1 SPRAY, METERED NASAL 2 TIMES DAILY
COMMUNITY
Start: 2024-04-22 | End: 2024-08-20

## 2024-08-20 RX ORDER — INSULIN ASPART 100 [IU]/ML
0-5 INJECTION, SOLUTION INTRAVENOUS; SUBCUTANEOUS
Status: DISCONTINUED | OUTPATIENT
Start: 2024-08-20 | End: 2024-08-20 | Stop reason: HOSPADM

## 2024-08-20 RX ORDER — MORPHINE SULFATE 4 MG/ML
4 INJECTION, SOLUTION INTRAMUSCULAR; INTRAVENOUS EVERY 6 HOURS PRN
Status: DISCONTINUED | OUTPATIENT
Start: 2024-08-20 | End: 2024-08-20 | Stop reason: HOSPADM

## 2024-08-20 RX ORDER — IBUPROFEN 200 MG
24 TABLET ORAL
Status: DISCONTINUED | OUTPATIENT
Start: 2024-08-20 | End: 2024-08-20

## 2024-08-20 RX ORDER — MAGNESIUM SULFATE HEPTAHYDRATE 40 MG/ML
2 INJECTION, SOLUTION INTRAVENOUS ONCE
Status: COMPLETED | OUTPATIENT
Start: 2024-08-20 | End: 2024-08-20

## 2024-08-20 RX ADMIN — CYCLOBENZAPRINE HYDROCHLORIDE 10 MG: 10 TABLET, FILM COATED ORAL at 10:08

## 2024-08-20 RX ADMIN — MAGNESIUM SULFATE 2 G: 2 INJECTION INTRAVENOUS at 12:08

## 2024-08-20 RX ADMIN — LIDOCAINE 1 PATCH: 50 PATCH CUTANEOUS at 10:08

## 2024-08-20 RX ADMIN — POTASSIUM BICARBONATE 20 MEQ: 391 TABLET, EFFERVESCENT ORAL at 02:08

## 2024-08-20 RX ADMIN — POTASSIUM BICARBONATE 50 MEQ: 978 TABLET, EFFERVESCENT ORAL at 09:08

## 2024-08-20 RX ADMIN — KETOROLAC TROMETHAMINE 10 MG: 30 INJECTION, SOLUTION INTRAMUSCULAR; INTRAVENOUS at 02:08

## 2024-08-20 RX ADMIN — ATORVASTATIN CALCIUM 40 MG: 40 TABLET, FILM COATED ORAL at 10:08

## 2024-08-20 RX ADMIN — HYDROCODONE BITARTRATE AND ACETAMINOPHEN 1 TABLET: 5; 325 TABLET ORAL at 01:08

## 2024-08-20 RX ADMIN — KETOROLAC TROMETHAMINE 10 MG: 30 INJECTION, SOLUTION INTRAMUSCULAR; INTRAVENOUS at 11:08

## 2024-08-20 NOTE — DISCHARGE INSTRUCTIONS
Diagnosis: Chest pain, palpitations, low potassium    I think that your chest pain is due to pain from your muscles and related to your shoulder pain.  I am prescribing medicine for pain, Toradol as well as muscle relaxants, Robaxin. You should take Tylenol in addition to this as needed for pain up to 3 grams daily which is 6 of the 500 mg extra strength tablets.  I am also switching you to a different type of potassium to see if this helps keep your potassium up.  Your lab tests, EKG and chest x-ray did not show any concerning findings.    I sent a referral to our Cardiology doctors for follow-up.  Please call to schedule a follow-up appointment.  If you start to have any worsening symptoms, or new symptoms such as trouble breathing, passing out or other concerns please return to the emergency department immediately.    Diagnóstico: Dolor en el pecho, palpitaciones, potasio bajo    Creo que el dolor en el pecho se debe al dolor de los músculos y está relacionado con el dolor en el hombro. Le estoy recetando analgésicos, Toradol, así andrew relajantes musculares, Robaxin. Además de esto, debe jarrell Tylenol según sea necesario para el dolor, hasta 3 gramos por día, es decir, 6 comprimidos de 500 mg de concentración extra. También le estoy cambiando a un tipo diferente de potasio para radha si esto ayuda a mantener el nivel de potasio alto. Los análisis de laboratorio, el electrocardiograma y la radiografía de tórax no mostraron ningún hallazgo preocupante.    Envié eliseo derivación a nuestros médicos de cardiología para un seguimiento. Llame para programar eliseo cal de seguimiento. Si comienza a tener síntomas que empeoran o nuevos síntomas andrew dificultad para respirar, desmayos u otras inquietudes, regrese al departamento de emergencias de inmediato.

## 2024-08-20 NOTE — ED PROVIDER NOTES
Encounter Date: 8/19/2024       History     Chief Complaint   Patient presents with    Chest Pain     Left side radiating down to arm /shoulder for 1wk ; SOB      58-year-old female with hypertension, diabetes, hyperlipidemia presents for left-sided chest pain for over a week.  Pain is constant, aching and sometimes sharp and stabbing. Pain radiates from her mid chest to her left shoulder.  She reports many years of shoulder pain, had a prior right-sided shoulder injury and has been doing physical therapy for a left-sided shoulder injury.  She was seen in the ED a week ago for the same complaint, has been taking ibuprofen and Tylenol without improvement.  She reports associated palpitations.  She denies shortness of breath, abdominal pain, nausea/vomiting, numbness, weakness or leg pain or swelling.  No history DVT/PE.  She has no personal cardiac history, she had a stress test 1 year ago which was normal.  She is primarily Stateless speaking, an  was used for this interview.      Review of patient's allergies indicates:  No Known Allergies  Past Medical History:   Diagnosis Date    Diabetes     HTN (hypertension)     Low serum potassium     Mixed hyperlipidemia      Past Surgical History:   Procedure Laterality Date    HYSTERECTOMY       No family history on file.  Social History     Tobacco Use    Smoking status: Never    Smokeless tobacco: Never   Substance Use Topics    Alcohol use: Never    Drug use: Never     Review of Systems    Physical Exam     Initial Vitals [08/19/24 2038]   BP Pulse Resp Temp SpO2   (!) 163/84 68 17 98 °F (36.7 °C) 99 %      MAP       --         Physical Exam    Nursing note and vitals reviewed.  Constitutional: She appears well-developed and well-nourished.   HENT:   Head: Normocephalic and atraumatic.   Neck: Neck supple.   Normal range of motion.  Cardiovascular:  Normal rate, regular rhythm, normal heart sounds and intact distal pulses.     Exam reveals no gallop and no  friction rub.       No murmur heard.  No lower extremity edema or tenderness   Pulmonary/Chest: Breath sounds normal. No respiratory distress. She has no wheezes. She has no rhonchi. She has no rales. She exhibits tenderness.   Musculoskeletal:         General: Normal range of motion.      Cervical back: Normal range of motion and neck supple. Muscular tenderness present.      Comments: No midline tenderness palpation of C, T or L-spine but there is significant paraspinous tenderness bilaterally as well as tenderness of the trapezius muscles, L>R     Neurological: She is alert and oriented to person, place, and time. She has normal strength. No sensory deficit.   Skin: Skin is warm and dry.   Psychiatric: She has a normal mood and affect.         ED Course   Procedures  Labs Reviewed   CBC W/ AUTO DIFFERENTIAL - Abnormal       Result Value    WBC 5.37      RBC 4.87      Hemoglobin 12.9      Hematocrit 38.5      MCV 79 (*)     MCH 26.5 (*)     MCHC 33.5      RDW 14.1      Platelets 200      MPV 12.1      Immature Granulocytes 0.4      Gran # (ANC) 2.7      Immature Grans (Abs) 0.02      Lymph # 2.0      Mono # 0.4      Eos # 0.1      Baso # 0.03      nRBC 0      Gran % 50.8      Lymph % 38.0      Mono % 8.0      Eosinophil % 2.2      Basophil % 0.6      Differential Method Automated     COMPREHENSIVE METABOLIC PANEL - Abnormal    Sodium 137      Potassium 3.0 (*)     Chloride 101      CO2 25      Glucose 109      BUN 27 (*)     Creatinine 0.8      Calcium 9.8      Total Protein 7.5      Albumin 4.0      Total Bilirubin 0.3      Alkaline Phosphatase 86      AST 19      ALT 17      eGFR >60.0      Anion Gap 11     TROPONIN I    Troponin I <0.006     B-TYPE NATRIURETIC PEPTIDE    BNP 22     D DIMER, QUANTITATIVE    D-Dimer <0.19     TROPONIN I    Troponin I <0.006     TROPONIN I   BASIC METABOLIC PANEL     EKG Readings: (Independently Interpreted)   Initial Reading: No STEMI. Rhythm: Normal Sinus Rhythm. Heart Rate: 71.  ST Segments: Non-Specific ST Segment Depression. Clinical Impression: Normal Sinus Rhythm       Imaging Results              X-Ray Chest PA And Lateral (Final result)  Result time 08/19/24 23:29:28      Final result by Dante Choe MD (08/19/24 23:29:28)                   Impression:      1.  No acute intrathoracic process.    2.  Scattered air-fluid levels in the upper abdomen.  Dedicated imaging of the abdomen, may be obtained further evaluation.      Electronically signed by: Dante Choe MD  Date:    08/19/2024  Time:    23:29               Narrative:    EXAMINATION:  XR CHEST PA AND LATERAL    CLINICAL HISTORY:  Chest pain, unspecified    TECHNIQUE:  PA and lateral views of the chest were performed.    COMPARISON:  08/12/2024.    FINDINGS:  The trachea is unremarkable.  The cardiomediastinal silhouette is stable.  There is no evidence of free air beneath the hemidiaphragms.  There are no pleural effusions.  There is no evidence of a pneumothorax.  There is no evidence of pneumomediastinum.  No airspace opacity is present.  There are degenerative changes in the osseous structures.    There are scattered air-fluid levels in the upper abdomen.  This is incompletely imaged.                                       Medications   potassium bicarbonate disintegrating tablet 20 mEq (has no administration in time range)   ondansetron injection 4 mg (has no administration in time range)   ketorolac injection 9.999 mg (has no administration in time range)   morphine injection 4 mg (has no administration in time range)   aluminum-magnesium hydroxide-simethicone 200-200-20 mg/5 mL suspension 30 mL (has no administration in time range)   ketorolac injection 9.999 mg (9.999 mg Intravenous Given 8/19/24 2146)   methocarbamoL tablet 1,000 mg (1,000 mg Oral Given 8/19/24 2146)   lactated ringers bolus 1,000 mL (0 mLs Intravenous Stopped 8/19/24 2250)   potassium bicarbonate disintegrating tablet 40 mEq (40 mEq Oral Given 8/19/24  2150)   HYDROcodone-acetaminophen 5-325 mg per tablet 1 tablet (1 tablet Oral Given 8/20/24 0132)     Medical Decision Making  58-year-old female presenting for chest pain.  Initially hypertensive at 163/84 with otherwise normal vitals.  She appears uncomfortable but nontoxic.    Differential diagnosis:  Muscle strain   Costochondritis   Low clinical suspicion for ACS   I doubt pulmonary embolus  GERD  Anxiety    Will check labs, give analgesics, chest x-ray, EKG and reassess.    Patient reports feeling better after therapy.  Workup is reassuring with negative D-dimer, negative troponin x2.  I initially planned to discharge the patient has since she had a normal stress test last year but pain returned, due to her risk factors, persistent symptoms and language barrier, will admit to ED observation unit for provocative testing.    Plan for observation:  Cardiac monitoring  Provocative testing-stress echo ordered  Trend troponin  Symptomatic treatment    Patient will be admitted to ED observation unit for further management.  Patient comfortable with admission.    Amount and/or Complexity of Data Reviewed  Labs: ordered. Decision-making details documented in ED Course.  Radiology: ordered and independent interpretation performed.  ECG/medicine tests: ordered and independent interpretation performed.    Risk  OTC drugs.  Prescription drug management.  Decision regarding hospitalization.      Additional MDM:   Heart Score:    History:          Slightly suspicious.  ECG:             Nonspecific repolarisation disturbance  Age:               45-65 years  Risk factors: >= 3 risk factors or history of atherosclerotic disease  Troponin:       Less than or equal to normal limit  Heart Score = 4                ED Course as of 08/20/24 0208   Mon Aug 19, 2024   2044 No STEMI on EKG [NN]   2146 BUN(!): 27 [CC]   2146 Potassium(!): 3.0 [CC]   2148 Troponin I: <0.006 [CC]   2148 BNP: 22 [CC]   2221 D-Dimer: <0.19 [CC]   Tue Aug 20,  2024   0007 Troponin I: <0.006 [CC]   0148 When I went to go reassess the patient, she is now reporting more severe pain.  Although I have a low suspicion for cardiac chest pain, will admit to ED observation unit for provocative testing. [CC]      ED Course User Index  [CC] Tasneem Shea PA-C  [NN] Cristina Shafer MD                           Clinical Impression:  Final diagnoses:  [R07.9] Chest pain (Primary)  [R07.89] Chest wall pain  [R00.2] Palpitations  [E87.6] Hypokalemia          ED Disposition Condition    Observation Stable                Tasneem Shea PA-C  08/20/24 0201

## 2024-08-20 NOTE — ED NOTES
Patient identifiers for Lita Culver checked and correct.    LOC: The patient is awake, alert and aware of environment with an appropriate affect, the patient is oriented x 4 and speaking appropriately.  APPEARANCE: Patient resting comfortably and in no acute distress, patient is clean and well groomed, patient's clothing is properly fastened.  SKIN: The skin is warm and dry, color consistent with ethnicity, patient has normal skin turgor and moist mucus membranes, skin intact, no breakdown or bruising noted.  MUSCULOSKELETAL: Patient moving all extremities well, no obvious swelling or deformities noted.  RESPIRATORY: Airway is open and patent, respirations are spontaneous and even, patient has a normal effort and rate.  CARDIAC: Patient has a normal rate and rhythm, no periphreal edema noted, capillary refill < 3 sec. Intermittent mid-sternal CP. Cardiac monitoring continued.  ABDOMEN: Soft and non tender to palpation, no distention noted. Patient denies any nausea, vomiting, diarrhea, or constipation.   NEUROLOGIC: Eyes open spontaneously, PERRL, behavior appropriate to situation, follows commands, facial expression symmetrical, purposeful motor response noted, normal sensation in all extremities.   HEENT: No abnormalities noted. White sclera and pupils equal round and reactive to light. Denies headache, dizziness.   : Pt voids independently, denies dysuria, hematuria, frequency.

## 2024-08-20 NOTE — ED TRIAGE NOTES
Lita Culver, a 58 y.o. female presents to the ED via POV w/ complaint of mid sternal and left sided chest pain that radiates down the left arm and shoulder and SOB x 1.5 weeks. Pt also c/o left sided arm swelling, productive cough and sore throat x 2 weeks. Pt denies fevers, chills, nausea, vomiting and diarrhea. GCS 15.    Triage note:  Chief Complaint   Patient presents with    Chest Pain     Left side radiating down to arm /shoulder for 1wk ; SOB      Review of patient's allergies indicates:  No Known Allergies  Past Medical History:   Diagnosis Date    Diabetes     HTN (hypertension)     Low serum potassium     Mixed hyperlipidemia

## 2024-08-20 NOTE — H&P
ED Observation Unit  History and Physical    Patient admitted by overnight ED team at 0154 on 8/20/24    I assumed care of this patient from the Main ED at the beginning of my shift, 0700 on 08/20/2024.       History of Present Illness:    58-year-old female with hypertension, diabetes, hyperlipidemia presents for left-sided chest pain for over a week. Pain is constant, aching and sometimes sharp and stabbing. Pain radiates from her mid chest to her left shoulder. She reports many years of shoulder pain, had a prior right-sided shoulder injury and has been doing physical therapy for a left-sided shoulder injury. She was seen in the ED a week ago for the same complaint, has been taking ibuprofen and Tylenol without improvement. She reports associated palpitations. She denies shortness of breath, abdominal pain, nausea/vomiting, numbness, weakness or leg pain or swelling. No history DVT/PE. She has no personal cardiac history, she had a stress test 1 year ago which was normal. She is primarily Bulgarian speaking, an  was used for this interview.     I reviewed the ED Provider Note dated 8/19/24 prior to my evaluation of this patient.  I reviewed all labs and imaging performed in the Main ED, prior to patient being placed in Observation. Patient was placed in the ED Observation Unit for Chest pain.    PMHx   Past Medical History:   Diagnosis Date    Diabetes     HTN (hypertension)     Low serum potassium     Mixed hyperlipidemia       Past Surgical History:   Procedure Laterality Date    HYSTERECTOMY          Family Hx   No family history on file.     Social Hx   Social History     Socioeconomic History    Marital status: Single   Tobacco Use    Smoking status: Never    Smokeless tobacco: Never   Substance and Sexual Activity    Alcohol use: Never    Drug use: Never        Vital Signs   Vitals:    08/20/24 0542 08/20/24 0749 08/20/24 0944 08/20/24 1058   BP:  134/81 (!) 144/76 124/81   BP Location:  Right arm  Right arm    Patient Position:  Lying Lying    Pulse: (!) 59 72 70 71   Resp: 11  16 16   Temp:  97.9 °F (36.6 °C) 98 °F (36.7 °C) 97.9 °F (36.6 °C)   TempSrc:  Oral Oral Oral   SpO2:  98% 98% 98%   Weight:       Height:            Review of Systems  Review of Systems   Cardiovascular:  Positive for chest pain.   Musculoskeletal:         L Arm, shoulder pain       Physical Exam  Physical Exam  Vitals reviewed.   Constitutional:       General: She is not in acute distress.     Appearance: She is not diaphoretic.   HENT:      Head: Normocephalic and atraumatic.   Cardiovascular:      Rate and Rhythm: Normal rate and regular rhythm.      Heart sounds: Heart sounds not distant. No murmur heard.     No friction rub. No gallop.   Pulmonary:      Effort: Pulmonary effort is normal. No respiratory distress.      Breath sounds: Normal breath sounds. No decreased breath sounds, wheezing, rhonchi or rales.   Chest:      Chest wall: Tenderness present.       Musculoskeletal:      Cervical back: Neck supple.      Comments: L mid trapezius muscle spasm, tenderness. Limited ROM of the L shoulder due to pain.   Distal pulses and sensation intact.   Skin:     General: Skin is warm and dry.   Neurological:      Mental Status: She is alert.   Psychiatric:         Mood and Affect: Mood and affect normal.         Medications:   Scheduled Meds:   atorvastatin  40 mg Oral Daily    chlorthalidone  25 mg Oral QHS    LIDOcaine  1 patch Transdermal ED 1 Time    sertraline  50 mg Oral QHS     Continuous Infusions:  PRN Meds:.  Current Facility-Administered Medications:     acetaminophen, 1,000 mg, Oral, Q8H PRN    aluminum-magnesium hydroxide-simethicone, 30 mL, Oral, Q4H PRN    dextrose 10%, 12.5 g, Intravenous, PRN    dextrose 10%, 25 g, Intravenous, PRN    dextrose, 16 g, Oral, PRN    dextrose, 24 g, Oral, PRN    glucagon (human recombinant), 1 mg, Intramuscular, PRN    insulin aspart U-100, 0-5 Units, Subcutaneous, QID (AC + HS) PRN     ketorolac, 9.999 mg, Intravenous, Q6H PRN    melatonin, 6 mg, Oral, Nightly PRN    morphine, 4 mg, Intravenous, Q6H PRN    ondansetron, 4 mg, Intravenous, Q6H PRN    sodium chloride 0.9%, 10 mL, Intravenous, PRN      Assessment/Plan:  L sided chest pain  LUE, L shoulder pain  - chest pain is reproducible on exam  - serial troponins negative x3  - monitoring on tele  - exercise stress echo today    Hypokalemia  - 3.0 last night at 2100.  She was given 40 mEq of oral supplementation last night.  - repeat this morning is unchanged in still 3.0.  Will give additional 40 mEq of oral supplementation  - recheck BMP  - check Mg  - Will give 2g IV Mg     Case was discussed with the ED provider, NIRAJ Shea

## 2024-08-20 NOTE — FIRST PROVIDER EVALUATION
"Medical screening exam completed.  I have conducted a focused provider triage encounter, findings are as follows:    Brief history of present illness:  Here for chest pain that radiates to the left arm. Started 1.5 weeks ago. Associated sob. No nausea or vomiting. No cardiac history.     Vitals:    08/19/24 2038   BP: (!) 163/84   BP Location: Right arm   Pulse: 68   Resp: 17   Temp: 98 °F (36.7 °C)   TempSrc: Oral   SpO2: 99%   Weight: 78.9 kg (173 lb 15.1 oz)   Height: 5' 4" (1.626 m)       Pertinent physical exam:  Awake, alert, ambulatory, NAD, non-labored    Brief workup plan:  Cardiac workup ordered    Preliminary workup initiated; this workup will be continued and followed by the physician or advanced practice provider that is assigned to the patient when roomed.   "

## 2024-08-20 NOTE — PROGRESS NOTES
ED Observation Unit  Progress Note      HPI   58-year-old female with hypertension, diabetes, hyperlipidemia presents for left-sided chest pain for over a week. Pain is constant, aching and sometimes sharp and stabbing. Pain radiates from her mid chest to her left shoulder. She reports many years of shoulder pain, had a prior right-sided shoulder injury and has been doing physical therapy for a left-sided shoulder injury. She was seen in the ED a week ago for the same complaint, has been taking ibuprofen and Tylenol without improvement. She reports associated palpitations. She denies shortness of breath, abdominal pain, nausea/vomiting, numbness, weakness or leg pain or swelling. No history DVT/PE. She has no personal cardiac history, she had a stress test 1 year ago which was normal. She is primarily Lebanese speaking, an  was used for this interview.     Interval History   Troponin trended and remained negative x3. Inpatient stress echo obtained, negative for ischemia. Chest pain reproducible on palpation, suspect MSK/costochondritis as etiology. Workup notable for hypokalemia - noted to take chlorthalidone at home for HTN which is likely the cause of her persistent hypokalemia. Discussed at length with Ms. Culver and her daughters about her options. Since she has an appointment with her PCP on 08/23 for follow up we elected to continue the chlorthalidone with increased home potassium supplementation in the meantime.      PMHx   Past Medical History:   Diagnosis Date    Diabetes     HTN (hypertension)     Low serum potassium     Mixed hyperlipidemia       Past Surgical History:   Procedure Laterality Date    HYSTERECTOMY          Family Hx   No family history on file.     Social Hx   Social History     Socioeconomic History    Marital status: Single   Tobacco Use    Smoking status: Never    Smokeless tobacco: Never   Substance and Sexual Activity    Alcohol use: Never    Drug use: Never        Vital  "Signs   Vitals:    08/20/24 0749 08/20/24 0944 08/20/24 1058 08/20/24 1523   BP: 134/81 (!) 144/76 124/81 127/68   BP Location: Right arm Right arm     Patient Position: Lying Lying     Pulse: 72 70 71 65   Resp:  16 16 18   Temp: 97.9 °F (36.6 °C) 98 °F (36.7 °C) 97.9 °F (36.6 °C) 97.9 °F (36.6 °C)   TempSrc: Oral Oral Oral Oral   SpO2: 98% 98% 98% 98%   Weight:   78.5 kg (173 lb)    Height:   5' 4" (1.626 m)         Review of Systems  Review of Systems   Constitutional:  Negative for chills, diaphoresis, fever and malaise/fatigue.   HENT:  Negative for sore throat.    Eyes:  Negative for double vision.   Respiratory:  Negative for cough, shortness of breath and wheezing.    Cardiovascular:  Negative for chest pain, palpitations, orthopnea, leg swelling and PND.   Gastrointestinal:  Negative for abdominal pain, blood in stool, constipation, heartburn, nausea and vomiting.   Genitourinary:  Negative for hematuria.   Musculoskeletal:  Positive for myalgias (anterior chest wall pain). Negative for falls.   Neurological:  Negative for dizziness, loss of consciousness, weakness and headaches.   Psychiatric/Behavioral:  The patient is not nervous/anxious.        Brief Physical Exam/Reassessment   Physical Exam  Constitutional:       General: She is not in acute distress.     Appearance: She is not ill-appearing or toxic-appearing.   Cardiovascular:      Rate and Rhythm: Normal rate and regular rhythm.   Pulmonary:      Effort: No respiratory distress.      Breath sounds: No wheezing.   Chest:      Chest wall: Tenderness (chest pain reproducible on palpation of anterior left chest wall) present.   Abdominal:      General: There is no distension.   Musculoskeletal:      Right lower leg: No edema.      Left lower leg: No edema.   Skin:     General: Skin is warm and dry.         Labs/Imaging   Labs Reviewed   CBC W/ AUTO DIFFERENTIAL - Abnormal       Result Value    WBC 5.37      RBC 4.87      Hemoglobin 12.9      Hematocrit " 38.5      MCV 79 (*)     MCH 26.5 (*)     MCHC 33.5      RDW 14.1      Platelets 200      MPV 12.1      Immature Granulocytes 0.4      Gran # (ANC) 2.7      Immature Grans (Abs) 0.02      Lymph # 2.0      Mono # 0.4      Eos # 0.1      Baso # 0.03      nRBC 0      Gran % 50.8      Lymph % 38.0      Mono % 8.0      Eosinophil % 2.2      Basophil % 0.6      Differential Method Automated     COMPREHENSIVE METABOLIC PANEL - Abnormal    Sodium 137      Potassium 3.0 (*)     Chloride 101      CO2 25      Glucose 109      BUN 27 (*)     Creatinine 0.8      Calcium 9.8      Total Protein 7.5      Albumin 4.0      Total Bilirubin 0.3      Alkaline Phosphatase 86      AST 19      ALT 17      eGFR >60.0      Anion Gap 11     BASIC METABOLIC PANEL - Abnormal    Sodium 139      Potassium 3.0 (*)     Chloride 106      CO2 27      Glucose 97      BUN 20      Creatinine 0.6      Calcium 8.6 (*)     Anion Gap 6 (*)     eGFR >60.0     HEMOGLOBIN A1C - Abnormal    Hemoglobin A1C 6.0 (*)     Estimated Avg Glucose 126     TROPONIN I    Troponin I <0.006     B-TYPE NATRIURETIC PEPTIDE    BNP 22     D DIMER, QUANTITATIVE    D-Dimer <0.19     TROPONIN I    Troponin I <0.006     TROPONIN I    Troponin I <0.006     HEMOGLOBIN A1C   BASIC METABOLIC PANEL    Sodium 138      Potassium 3.6      Chloride 100      CO2 29      Glucose 94      BUN 18      Creatinine 0.7      Calcium 9.4      Anion Gap 9      eGFR >60.0      Narrative:     ADD ON MAG PER MAYUR JIMENEZ PA-C ORDER# [id number] @  08/20/2024    12:27    MAGNESIUM   MAGNESIUM    Magnesium 1.8      Narrative:     ADD ON MAG PER MAYUR JIMENEZ PA-C ORDER# [id number] @  08/20/2024    12:27    POCT GLUCOSE    POCT Glucose 107     POCT GLUCOSE MONITORING CONTINUOUS      Imaging Results              X-Ray Chest PA And Lateral (Final result)  Result time 08/19/24 23:29:28      Final result by Dante Choe MD (08/19/24 23:29:28)                   Impression:      1.  No acute intrathoracic  process.    2.  Scattered air-fluid levels in the upper abdomen.  Dedicated imaging of the abdomen, may be obtained further evaluation.      Electronically signed by: Dante Choe MD  Date:    08/19/2024  Time:    23:29               Narrative:    EXAMINATION:  XR CHEST PA AND LATERAL    CLINICAL HISTORY:  Chest pain, unspecified    TECHNIQUE:  PA and lateral views of the chest were performed.    COMPARISON:  08/12/2024.    FINDINGS:  The trachea is unremarkable.  The cardiomediastinal silhouette is stable.  There is no evidence of free air beneath the hemidiaphragms.  There are no pleural effusions.  There is no evidence of a pneumothorax.  There is no evidence of pneumomediastinum.  No airspace opacity is present.  There are degenerative changes in the osseous structures.    There are scattered air-fluid levels in the upper abdomen.  This is incompletely imaged.                                       Echocardiogram exercise stress test     Post-stress Impression: The study is negative with no echocardiographic evidence of stress induced ischemia.    ECG Conclusion: The ECG portion of the study is negative for ischemia.    Stress Protocol: The patient exercised for 5 minutes 57 seconds on a high ramp protocol, corresponding to a functional capacity of 9 METS, achieving a peak heart rate of 136 bpm, which is 89% of the age predicted maximum heart rate. Their exercise capacity was average. The patient reported no symptoms during the stress test. The test was stopped because the patient experienced fatigue.    Left Ventricle: The left ventricle is normal in size. Normal wall thickness. Normal wall motion. There is normal systolic function with a visually estimated ejection fraction of 60 - 65%. There is normal diastolic function.    Right Ventricle: Normal right ventricular cavity size. Wall thickness is normal. Systolic function is normal.    I reviewed all labs, imaging,EKGs.     Plan   L sided chest pain  LUE, L  shoulder pain  - chest pain is reproducible on exam  - serial troponins negative x3  - monitoring on tele  - exercise stress echo negative for ischemia      Hypokalemia  Lab Results   Component Value Date    K 3.6 08/20/2024   - likely 2/2 chlorthalidone use  - increase home supplementation on DC     I have discussed this case with Astrid Henson PA-C.       Rosario Cueva PA-C  [facility] [address]

## 2024-08-20 NOTE — ED NOTES
Pt identifiers Lita Culver were checked and are correct  LOC: The patient is awake, alert, aware of environment with an appropriate affect. Oriented x4 speaking appropriately  APPEARANCE: Pt rates left shoulder pain a 5/10 , in no acute distress, pt is clean and well groomed, clothing properly fastened  SKIN: Skin warm, dry and intact, normal skin turgor, moist mucus membranes  RESPIRATORY: Airway is open and patent, respirations are spontaneous, even and unlabored, normal effort and rate  CARDIAC: Normal rate and rhythm, no peripheral edema noted, capillary refill < 3 seconds, bilateral radial pulses 2+  pt is on a cardiac monitor and pulse oximetry   ABDOMEN: Soft, nontender, nondistended. Bowel sounds present to all four quad of abd on auscultation  NEUROLOGIC: PERRL, facial expression is symmetrical, patient moving all extremities spontaneously, normal sensation in all extremities when touched with a finger.  Follows all commands appropriately  MUSCULOSKELETAL: No obvious deformities.

## 2024-08-21 NOTE — DISCHARGE SUMMARY
ED Observation Unit  Discharge Summary        History of Present Illness:    58-year-old female with hypertension, diabetes, hyperlipidemia presents for left-sided chest pain for over a week. Pain is constant, aching and sometimes sharp and stabbing. Pain radiates from her mid chest to her left shoulder. She reports many years of shoulder pain, had a prior right-sided shoulder injury and has been doing physical therapy for a left-sided shoulder injury. She was seen in the ED a week ago for the same complaint, has been taking ibuprofen and Tylenol without improvement. She reports associated palpitations. She denies shortness of breath, abdominal pain, nausea/vomiting, numbness, weakness or leg pain or swelling. No history DVT/PE. She has no personal cardiac history, she had a stress test 1 year ago which was normal. She is primarily Bengali speaking, an  was used for this interview.     Observation Course:    Troponin trended and remained negative x3. Inpatient stress echo obtained, negative for ischemia. Chest pain reproducible on palpation, suspect MSK/costochondritis as etiology. Workup notable for hypokalemia - noted to take chlorthalidone at home for HTN which is likely the cause of her persistent hypokalemia. Discussed at length with Ms. Culver and her daughters about her options. Since she has an appointment with her PCP on 08/23 for follow up we elected to continue the chlorthalidone with increased home potassium supplementation in the meantime. Discharged with oral toradol, robaxin for pain and PO potassium.     Echocardiogram exercise stress test     Post-stress Impression: The study is negative with no echocardiographic evidence of stress induced ischemia.    ECG Conclusion: The ECG portion of the study is negative for ischemia.    Stress Protocol: The patient exercised for 5 minutes 57 seconds on a high ramp protocol, corresponding to a functional capacity of 9 METS, achieving a peak heart  rate of 136 bpm, which is 89% of the age predicted maximum heart rate. Their exercise capacity was average. The patient reported no symptoms during the stress test. The test was stopped because the patient experienced fatigue.    Left Ventricle: The left ventricle is normal in size. Normal wall thickness. Normal wall motion. There is normal systolic function with a visually estimated ejection fraction of 60 - 65%. There is normal diastolic function.    Right Ventricle: Normal right ventricular cavity size. Wall thickness is normal. Systolic function is normal.    Consultants:    none    Final Diagnosis:  Costochondritis  Hypokalemia     Discharge Condition: Good    Disposition: Home or Self Care     Time spent on the discharge of the patient including review of hospital course with the patient. reviewing discharge medications and arranging follow-up care 35 minutes.  Patient was seen and examined on the date of discharge and determined to be suitable for discharge.    Follow Up:  No future appointments.  PCP    Rosario Cueva PA-C  [facility]   [facility]

## 2025-02-11 ENCOUNTER — HOSPITAL ENCOUNTER (EMERGENCY)
Facility: HOSPITAL | Age: 60
Discharge: HOME | End: 2025-02-12
Attending: STUDENT IN AN ORGANIZED HEALTH CARE EDUCATION/TRAINING PROGRAM
Payer: MEDICAID

## 2025-02-11 DIAGNOSIS — F41.9 ANXIETY: ICD-10-CM

## 2025-02-11 DIAGNOSIS — R00.2 PALPITATIONS: Primary | ICD-10-CM

## 2025-02-11 DIAGNOSIS — R07.9 CHEST PAIN: ICD-10-CM

## 2025-02-11 DIAGNOSIS — R03.0 ELEVATED BLOOD PRESSURE READING: ICD-10-CM

## 2025-02-11 LAB
BASOPHILS # BLD AUTO: 0.04 K/UL (ref 0–0.2)
BASOPHILS NFR BLD: 0.7 % (ref 0–1.9)
BNP SERPL-MCNC: 33 PG/ML (ref 0–99)
DIFFERENTIAL METHOD BLD: ABNORMAL
EOSINOPHIL # BLD AUTO: 0.1 K/UL (ref 0–0.5)
EOSINOPHIL NFR BLD: 1.8 % (ref 0–8)
ERYTHROCYTE [DISTWIDTH] IN BLOOD BY AUTOMATED COUNT: 14.6 % (ref 11.5–14.5)
HCT VFR BLD AUTO: 41.5 % (ref 37–48.5)
HGB BLD-MCNC: 13.2 G/DL (ref 12–16)
IMM GRANULOCYTES # BLD AUTO: 0.01 K/UL (ref 0–0.04)
IMM GRANULOCYTES NFR BLD AUTO: 0.2 % (ref 0–0.5)
LYMPHOCYTES # BLD AUTO: 2.1 K/UL (ref 1–4.8)
LYMPHOCYTES NFR BLD: 37.1 % (ref 18–48)
MCH RBC QN AUTO: 25.3 PG (ref 27–31)
MCHC RBC AUTO-ENTMCNC: 31.8 G/DL (ref 32–36)
MCV RBC AUTO: 80 FL (ref 82–98)
MONOCYTES # BLD AUTO: 0.5 K/UL (ref 0.3–1)
MONOCYTES NFR BLD: 8.3 % (ref 4–15)
NEUTROPHILS # BLD AUTO: 2.9 K/UL (ref 1.8–7.7)
NEUTROPHILS NFR BLD: 51.9 % (ref 38–73)
NRBC BLD-RTO: 0 /100 WBC
PLATELET # BLD AUTO: 199 K/UL (ref 150–450)
PMV BLD AUTO: 12.4 FL (ref 9.2–12.9)
RBC # BLD AUTO: 5.22 M/UL (ref 4–5.4)
TROPONIN I SERPL DL<=0.01 NG/ML-MCNC: <3 NG/L (ref 0–14)
WBC # BLD AUTO: 5.55 K/UL (ref 3.9–12.7)

## 2025-02-11 PROCEDURE — 83880 ASSAY OF NATRIURETIC PEPTIDE: CPT | Performed by: STUDENT IN AN ORGANIZED HEALTH CARE EDUCATION/TRAINING PROGRAM

## 2025-02-11 PROCEDURE — 94761 N-INVAS EAR/PLS OXIMETRY MLT: CPT

## 2025-02-11 PROCEDURE — 99285 EMERGENCY DEPT VISIT HI MDM: CPT | Mod: 25

## 2025-02-11 PROCEDURE — 84484 ASSAY OF TROPONIN QUANT: CPT | Performed by: STUDENT IN AN ORGANIZED HEALTH CARE EDUCATION/TRAINING PROGRAM

## 2025-02-11 PROCEDURE — 93005 ELECTROCARDIOGRAM TRACING: CPT

## 2025-02-11 PROCEDURE — 85025 COMPLETE CBC W/AUTO DIFF WBC: CPT | Performed by: STUDENT IN AN ORGANIZED HEALTH CARE EDUCATION/TRAINING PROGRAM

## 2025-02-12 VITALS
SYSTOLIC BLOOD PRESSURE: 168 MMHG | TEMPERATURE: 98 F | HEART RATE: 62 BPM | DIASTOLIC BLOOD PRESSURE: 98 MMHG | RESPIRATION RATE: 16 BRPM | OXYGEN SATURATION: 96 %

## 2025-02-12 LAB
ALBUMIN SERPL BCP-MCNC: 3.9 G/DL (ref 3.5–5.2)
ALP SERPL-CCNC: 106 U/L (ref 40–150)
ALT SERPL W/O P-5'-P-CCNC: 16 U/L (ref 10–44)
ANION GAP SERPL CALC-SCNC: 9 MMOL/L (ref 8–16)
AST SERPL-CCNC: 16 U/L (ref 10–40)
BILIRUB SERPL-MCNC: 0.4 MG/DL (ref 0.1–1)
BUN SERPL-MCNC: 15 MG/DL (ref 8–23)
CALCIUM SERPL-MCNC: 8.9 MG/DL (ref 8.7–10.5)
CHLORIDE SERPL-SCNC: 107 MMOL/L (ref 95–110)
CO2 SERPL-SCNC: 24 MMOL/L (ref 23–29)
CREAT SERPL-MCNC: 0.6 MG/DL (ref 0.5–1.4)
EST. GFR  (NO RACE VARIABLE): >60 ML/MIN/1.73 M^2
GLUCOSE SERPL-MCNC: 104 MG/DL (ref 70–110)
OHS QRS DURATION: 70 MS
OHS QTC CALCULATION: 432 MS
POTASSIUM SERPL-SCNC: 3.7 MMOL/L (ref 3.5–5.1)
PROT SERPL-MCNC: 7.2 G/DL (ref 6–8.4)
SODIUM SERPL-SCNC: 140 MMOL/L (ref 136–145)
TROPONIN I SERPL DL<=0.01 NG/ML-MCNC: <3 NG/L (ref 0–14)

## 2025-02-12 PROCEDURE — 84484 ASSAY OF TROPONIN QUANT: CPT | Performed by: STUDENT IN AN ORGANIZED HEALTH CARE EDUCATION/TRAINING PROGRAM

## 2025-02-12 PROCEDURE — 80053 COMPREHEN METABOLIC PANEL: CPT | Performed by: STUDENT IN AN ORGANIZED HEALTH CARE EDUCATION/TRAINING PROGRAM

## 2025-02-12 PROCEDURE — 25000003 PHARM REV CODE 250: Performed by: STUDENT IN AN ORGANIZED HEALTH CARE EDUCATION/TRAINING PROGRAM

## 2025-02-12 RX ORDER — ACETAMINOPHEN 500 MG
1000 TABLET ORAL
Status: COMPLETED | OUTPATIENT
Start: 2025-02-12 | End: 2025-02-12

## 2025-02-12 RX ORDER — HYDROXYZINE PAMOATE 25 MG/1
25 CAPSULE ORAL EVERY 6 HOURS PRN
Qty: 20 CAPSULE | Refills: 0 | Status: SHIPPED | OUTPATIENT
Start: 2025-02-12

## 2025-02-12 RX ORDER — HYDROXYZINE PAMOATE 25 MG/1
25 CAPSULE ORAL
Status: COMPLETED | OUTPATIENT
Start: 2025-02-12 | End: 2025-02-12

## 2025-02-12 RX ADMIN — HYDROXYZINE PAMOATE 25 MG: 25 CAPSULE ORAL at 01:02

## 2025-02-12 RX ADMIN — ACETAMINOPHEN 1000 MG: 500 TABLET ORAL at 01:02

## 2025-02-12 NOTE — DISCHARGE INSTRUCTIONS
Your cardiac work up in the emergency department was unremarkable today.    I recommend close follow up with a cardiologist and a primary care doctor.

## 2025-02-12 NOTE — ED PROVIDER NOTES
Encounter Date: 2/11/2025       History     Chief Complaint   Patient presents with    Chest Pain    Hypertension     Pt has c/o chest pain, SOB beginning tonight. Denies cough. Per son pts BP has also been elevated at home.      59 y.o. female with HTN, DM, HLD presents for palpitations and high blood pressure readings at home.  Patient reports feeling heart palpitations at feels like her heart was pounding in her chest earlier tonight.  She also reports high blood pressure readings at home in the 170s systolic.  She also reports chest discomfort described as heart pounding and palpitations with some tightness.  The chest discomfort did not radiate.  It was associated with some shortness of breath.  She did not have any diaphoresis or nausea/vomiting.  She also reports longstanding bilateral shoulder pain that has been worsening recently and is not specifically associated with the chest pain.  She denies any abdominal pain, vision changes, loss of consciousness, peripheral edema    The history is provided by the patient and medical records. A  was used.     Review of patient's allergies indicates:  No Known Allergies  Past Medical History:   Diagnosis Date    Diabetes     HTN (hypertension)     Low serum potassium     Mixed hyperlipidemia      Past Surgical History:   Procedure Laterality Date    HYSTERECTOMY       No family history on file.  Social History     Tobacco Use    Smoking status: Never    Smokeless tobacco: Never   Substance Use Topics    Alcohol use: Never    Drug use: Never     Review of Systems   Reason unable to perform ROS: See HPI for relevant ROS.       Physical Exam     Initial Vitals [02/11/25 2204]   BP Pulse Resp Temp SpO2   (!) 184/90 72 (!) 24 98.2 °F (36.8 °C) 99 %      MAP       --         Physical Exam    Nursing note and vitals reviewed.  Constitutional:   Alert, normal work of breathing, anxious appearing   Eyes: Conjunctivae are normal. No scleral icterus.    Cardiovascular:  Normal rate, regular rhythm and intact distal pulses.           Pulmonary/Chest: Breath sounds normal. No stridor. No respiratory distress.   Abdominal: Abdomen is soft. She exhibits no distension. There is no abdominal tenderness.   Musculoskeletal:         General: No edema.     Neurological: She is alert and oriented to person, place, and time. She has normal strength. No cranial nerve deficit or sensory deficit.   Skin: Skin is warm and dry.         ED Course   Procedures  Labs Reviewed   CBC W/ AUTO DIFFERENTIAL - Abnormal       Result Value    WBC 5.55      RBC 5.22      Hemoglobin 13.2      Hematocrit 41.5      MCV 80 (*)     MCH 25.3 (*)     MCHC 31.8 (*)     RDW 14.6 (*)     Platelets 199      MPV 12.4      Immature Granulocytes 0.2      Gran # (ANC) 2.9      Immature Grans (Abs) 0.01      Lymph # 2.1      Mono # 0.5      Eos # 0.1      Baso # 0.04      nRBC 0      Gran % 51.9      Lymph % 37.1      Mono % 8.3      Eosinophil % 1.8      Basophil % 0.7      Differential Method Automated     TROPONIN I HIGH SENSITIVITY    Troponin I High Sensitivity <3     TROPONIN I HIGH SENSITIVITY    Troponin I High Sensitivity <3     B-TYPE NATRIURETIC PEPTIDE    BNP 33     COMPREHENSIVE METABOLIC PANEL    Sodium 140      Potassium 3.7      Chloride 107      CO2 24      Glucose 104      BUN 15      Creatinine 0.6      Calcium 8.9      Total Protein 7.2      Albumin 3.9      Total Bilirubin 0.4      Alkaline Phosphatase 106      AST 16      ALT 16      eGFR >60.0      Anion Gap 9            Imaging Results              X-Ray Chest AP Portable (In process)                      Medications   hydrOXYzine pamoate capsule 25 mg (25 mg Oral Given 2/12/25 0151)   acetaminophen tablet 1,000 mg (1,000 mg Oral Given 2/12/25 0151)     Medical Decision Making  59 y.o. female with HTN, DM, HLD presents for palpitations and high blood pressure readings at home.  Differentials include anxiety, HTN, musculoskeletal  pain, ACS, GERD, gastritis, doubt PE  Patient presenting with concerns her blood pressure readings being high at home.  Also episode of heart pounding in her chest, palpitations.  She reports increased stress/anxiety at home and general.  She was also very concerned about her blood pressure reading.  EKG with no evidence of acute ischemia or arrhythmia, shows nonspecific abnormality that is similar to prior and has been present since her last stress test which was negative  Lungs clear, no hypoxia, no tachypnea  No neurologic deficits, no evidence of heart failure/fluid overload, lungs clear, no evidence of end-organ injury, doubt hypertensive emergency  Counseled patient extensively regarding her blood pressure and taking it routinely in the morning and the evening, keeping a blood pressure log    Amount and/or Complexity of Data Reviewed  External Data Reviewed: labs, radiology, ECG and notes.  Labs: ordered. Decision-making details documented in ED Course.  Radiology: ordered. Decision-making details documented in ED Course.  ECG/medicine tests:  Decision-making details documented in ED Course.               ED Course as of 02/12/25 0156   Tue Feb 11, 2025   2350 BNP: 33 [OK]   2350 Troponin I High Sensitivity: <3 [OK]   Wed Feb 12, 2025   0031 EKG 12-lead  Findings, per independent interpretation:  Sinus rhythm, regular, narrow complex, rate of 74, no STEMI, nonspecific ST abnormality in the inferior leads, no significant change compared to prior [OK]   0053 X-Ray Chest AP Portable  Findings, per independent interpretation:  Symmetrically expanded lungs, no focal consolidation, no pulmonary edema   [OK]   0141 Troponin I High Sensitivity: <3 [OK]   0154 On re-evaluation, patient continues to have some palpitations.  Delta troponin within normal limits.  Chest x-ray unremarkable.  Patient to be discharge pending final chest x-ray interpretation [OK]      ED Course User Index  [OK] Shadi Hernandez MD                            Clinical Impression:  Final diagnoses:  [R07.9] Chest pain  [R03.0] Elevated blood pressure reading  [R00.2] Palpitations (Primary)  [F41.9] Anxiety          ED Disposition Condition    Discharge Stable          ED Prescriptions       Medication Sig Dispense Start Date End Date Auth. Provider    hydrOXYzine pamoate (VISTARIL) 25 MG Cap Take 1 capsule (25 mg total) by mouth every 6 (six) hours as needed (Anxiety). 20 capsule 2/12/2025 -- Shadi Hernandez MD          Follow-up Information       Follow up With Specialties Details Why Contact Info Additional Information    [facility] - 3rd Fl Cardiology Schedule an appointment as soon as possible for a visit   [id number] [facility] [id number] [phone] Cardiology Services Clinics - 3rd floor    [facility] - Emergency Dept Emergency Medicine  As needed, Worsening shortness of breath, chest pain, vision changes, weakness on 1 side of your body, or for any other concerning symptoms [id number]  [facility] [id number] [phone]              Shadi Hernandez MD  02/12/25 0156